# Patient Record
Sex: MALE | Race: WHITE | NOT HISPANIC OR LATINO | Employment: FULL TIME | URBAN - METROPOLITAN AREA
[De-identification: names, ages, dates, MRNs, and addresses within clinical notes are randomized per-mention and may not be internally consistent; named-entity substitution may affect disease eponyms.]

---

## 2017-03-20 ENCOUNTER — OFFICE VISIT (OUTPATIENT)
Dept: FAMILY MEDICINE | Facility: CLINIC | Age: 37
End: 2017-03-20
Payer: COMMERCIAL

## 2017-03-20 VITALS
SYSTOLIC BLOOD PRESSURE: 108 MMHG | DIASTOLIC BLOOD PRESSURE: 70 MMHG | OXYGEN SATURATION: 97 % | WEIGHT: 188.7 LBS | HEIGHT: 73 IN | BODY MASS INDEX: 25.01 KG/M2 | HEART RATE: 82 BPM | TEMPERATURE: 98.4 F

## 2017-03-20 DIAGNOSIS — Z71.6 ENCOUNTER FOR SMOKING CESSATION COUNSELING: ICD-10-CM

## 2017-03-20 DIAGNOSIS — F17.200 TOBACCO USE DISORDER: ICD-10-CM

## 2017-03-20 DIAGNOSIS — H65.01 ACUTE SEROUS OTITIS MEDIA, RIGHT EAR: Primary | ICD-10-CM

## 2017-03-20 DIAGNOSIS — J06.9 UPPER RESPIRATORY TRACT INFECTION, UNSPECIFIED TYPE: ICD-10-CM

## 2017-03-20 PROCEDURE — 99214 OFFICE O/P EST MOD 30 MIN: CPT | Performed by: FAMILY MEDICINE

## 2017-03-20 RX ORDER — FLUTICASONE PROPIONATE 50 MCG
2 SPRAY, SUSPENSION (ML) NASAL DAILY
Qty: 16 G | Refills: 3 | Status: SHIPPED | OUTPATIENT
Start: 2017-03-20 | End: 2018-08-08

## 2017-03-20 NOTE — MR AVS SNAPSHOT
"              After Visit Summary   3/20/2017    Dain Keys    MRN: [id number]           Patient Information     Date Of Birth          1980        Visit Information        Provider Department      3/20/2017 10:20 AM Katherine Wang MD [facility]        Today's Diagnoses     Acute serous otitis media, right ear    -  1    Upper respiratory tract infection, unspecified type          Care Instructions    Use Saline spray first and then follow with Flonase for ears and sinus symptoms. Do this until you feel better.     Use 600mg of Mucinex 2x a day for congestion.     Use Ibuprofen for headache and pain.     If ear pain persists or does not improve in 2-3 weeks, follow up.         Follow-ups after your visit        Who to contact     If you have questions or need follow up information about today's clinic visit or your schedule please contact [facility] directly at [phone].  Normal or non-critical lab and imaging results will be communicated to you by MyChart, letter or phone within 4 business days after the clinic has received the results. If you do not hear from us within 7 days, please contact the clinic through [employer] or phone. If you have a critical or abnormal lab result, we will notify you by phone as soon as possible.  Submit refill requests through [employer] or call your pharmacy and they will forward the refill request to us. Please allow 3 business days for your refill to be completed.          Additional Information About Your Visit        MyChart Information     [employer] lets you send messages to your doctor, view your test results, renew your prescriptions, schedule appointments and more. To sign up, go to [email or web address] . Click on \"Log in\" on the left side of the screen, which will take you to the Welcome page. Then click on \"Sign up Now\" on the right side of the page.     You will be asked to enter the access code listed below, as well " "as some personal information. Please follow the directions to create your username and password.     Your access code is: [email or web address]  Expires: 2017 10:59 AM     Your access code will  in 90 days. If you need help or a new code, please call your [place] clinic or [phone].        Care EveryWhere ID     This is your Care EveryWhere ID. This could be used by other organizations to access your [place] medical records  [id number]        Your Vitals Were     Pulse Temperature Height Pulse Oximetry BMI (Body Mass Index)       82 98.4  F (36.9  C) (Oral) 1.85 m (6' 0.83\") 97% 25.01 kg/m2        Blood Pressure from Last 3 Encounters:   17 108/70   16 116/72   16 105/70    Weight from Last 3 Encounters:   17 85.6 kg (188 lb 11.2 oz)   16 80.9 kg (178 lb 4.8 oz)   16 81.8 kg (180 lb 4.8 oz)              Today, you had the following     No orders found for display         Where to get your medicines      These medications were sent to [place] [id number] [place] [facility] [address] [facility]  [address] [facility] [address]     Phone:  [phone]     fluticasone 50 MCG/ACT spray          Primary Care Provider    [facility]       No address on file        Thank you!     Thank you for choosing [facility]  for your care. Our goal is always to provide you with excellent care. Hearing back from our patients is one way we can continue to improve our services. Please take a few minutes to complete the written survey that you may receive in the mail after your visit with us. Thank you!             Your Updated Medication List - Protect others around you: Learn how to safely use, store and throw away your medicines at www.disposemymeds.org.          This list is accurate as of: 3/20/17 11:00 AM.  Always use your most recent med list.                   Brand Name Dispense Instructions for use    aluminum chloride 20 % " external solution    DRYSOL    60 mL    Apply topically At Bedtime To improve effect, cover area of application with plastic wrap,  hold in place with tight shirt, and wash area in morning. As sweating improves, decrease use to 1-2 times weekly.       amLODIPine 2.5 MG tablet    NORVASC    30 tablet    Take 1 tablet (2.5 mg) by mouth daily       buPROPion 150 MG 12 hr tablet    WELLBUTRIN SR    60 tablet    Take 1 tablet once daily for first 3 days and increase to 1 tablet twice daily from day 4       fluticasone 50 MCG/ACT spray    FLONASE    16 g    Spray 2 sprays into both nostrils daily

## 2017-03-20 NOTE — PROGRESS NOTES
"  SUBJECTIVE:                                                    Dain Keys is a 36 year old male who presents to clinic today for the following health issues:      Acute Illness   Acute illness concerns: Ears/congestion  Onset: 3 weeks ear/ URI 1 day    Fever: no    Chills/Sweats: no    Headache (location?): YES    Sinus Pressure:YES    Conjunctivitis:  no    Ear Pain: YES: pressure    Rhinorrhea: YES    Congestion: YES    Sore Throat: YES     Cough: YES-productive of clear sputum    Wheeze: YES    Decreased Appetite: no    Nausea: no    Vomiting: no    Diarrhea:  no    Dysuria/Freq.: no    Fatigue/Achiness: no    Sick/Strep Exposure: no     Therapies Tried and outcome: OTC      Problem list and histories reviewed & adjusted, as indicated.  Additional history: as documented      Dain  will get ill with season changes, happens approx. 1x every three years and is usually with the transition from Fall to Winter. has been having a productive cough-yellow sputum as well as nasal congestion. Has a chest burn with coughing and hears mucus when he coughs. Accompanying sx's include frontal headache, sinus pressure, \"underwater sound\" triggered by putting his head down.  He has tried OTC Sudafed, Tylenol PM. Ear pain has been ongoing for 3-4 weeks, started during vacation and pain worsened throughout the vacation. Went to urgent care and physician noted stressed blood vessels and reccommended to f/u with provider when he returned if sx's did not improve. Denies fever. Neck feels achy.   Acute cold sx's only present for the last day      Additional Notes  - He is currently smoking 3-4 cigarettes a day. He  is ready to quit smoking. He has quit in the past with running and chewing tobacco. Has used Chantix but states it messed with his head.       Patient Active Problem List   Diagnosis     CARDIOVASCULAR SCREENING; LDL GOAL LESS THAN 160     Other seborrheic keratosis right posterior thorax      Muscle strain of left " shoulder     Anxiety     Raynaud's disease without gangrene     Primary focal hyperhidrosis, palms     Tobacco use disorder     Past Surgical History   Procedure Laterality Date     Hc tooth extraction w/forcep         Social History   Substance Use Topics     Smoking status: Current Every Day Smoker     Packs/day: 0.25     Years: 17.00     Types: Cigarettes     Smokeless tobacco: Never Used     Alcohol use Yes      Comment: rarely      Family History   Problem Relation Age of Onset     Depression/Anxiety Father      DIABETES Maternal Grandfather      CEREBROVASCULAR DISEASE Maternal Grandfather      Cardiovascular Maternal Grandfather      DIABETES Maternal Aunt      Coronary Artery Disease No family hx of      Hypertension No family hx of      Hyperlipidemia No family hx of      Breast Cancer No family hx of      Cancer - colorectal No family hx of      Ovarian Cancer No family hx of      Prostate Cancer No family hx of      Anesthesia Reaction No family hx of      Thyroid Disease No family hx of      Asthma No family hx of      OSTEOPOROSIS No family hx of      Chemical Addiction No family hx of      Known Genetic Syndrome No family hx of          Current Outpatient Prescriptions   Medication Sig Dispense Refill     fluticasone (FLONASE) 50 MCG/ACT nasal spray Spray 2 sprays into both nostrils daily (Patient not taking: Reported on 3/20/2017) 16 g 3     amLODIPine (NORVASC) 2.5 MG tablet Take 1 tablet (2.5 mg) by mouth daily (Patient not taking: Reported on 3/20/2017) 30 tablet 1     aluminum chloride (DRYSOL) 20 % external solution Apply topically At Bedtime To improve effect, cover area of application with plastic wrap,  hold in place with tight shirt, and wash area in morning. As sweating improves, decrease use to 1-2 times weekly. (Patient not taking: Reported on 3/20/2017) 60 mL 4     buPROPion (WELLBUTRIN SR) 150 MG 12 hr tablet Take 1 tablet once daily for first 3 days and increase to 1 tablet twice daily  "from day 4 (Patient not taking: Reported on 3/20/2017) 60 tablet 3     Allergies   Allergen Reactions     Ceclor [Cefaclor]      Septra [Sulfamethoxazole W/Trimethoprim]        Reviewed and updated as needed this visit by clinical staff  Tobacco  Allergies  Meds  Med Hx  Surg Hx  Fam Hx  Soc Hx      Reviewed and updated as needed this visit by Provider         ROS:  Constitutional, HEENT, cardiovascular, pulmonary, gi and gu systems are negative, except as otherwise noted.    OBJECTIVE:                                                    /70 (BP Location: Right arm, Patient Position: Chair, Cuff Size: Adult Large)  Pulse 82  Temp 98.4  F (36.9  C) (Oral)  Ht 1.85 m (6' 0.83\")  Wt 85.6 kg (188 lb 11.2 oz)  SpO2 97%  BMI 25.01 kg/m2  Body mass index is 25.01 kg/(m^2).  GENERAL: healthy, alert and no distress  HENT: ear canals normal,air fluid levels present behind right TM. mild nasal mucosal edema. mouth without ulcers or lesions  NECK: no adenopathy, no asymmetry, masses, or scars and thyroid normal to palpation  RESP: lungs clear to auscultation - no rales, rhonchi or wheezes  CV: regular rate and rhythm, normal S1 S2, no S3 or S4, no murmur, click or rub, no peripheral edema and peripheral pulses strong  PSYCH: mentation appears normal, affect normal/bright    Diagnostic Test Results:  none      ASSESSMENT/PLAN:                                                      1. Acute serous otitis media, right ear  2. Upper respiratory tract infection, unspecified type  Reviewed symptomatic management of symptoms including Flonase and saline rinses. Patient education provided, including expected course of illness and symptoms that may occur which would require urgent evalution. All questions answered.   Patient understands and agrees with plan. Reviewed red flag symptoms that would precipitate the need for routine, urgent or emergent f/u.   - fluticasone (FLONASE) 50 MCG/ACT spray; Spray 2 sprays into both " nostrils daily  Dispense: 16 g; Refill: 3      3. Tobacco use disorder  4. Encounter for smoking cessation counseling  Reviewed different options including Pharmacotherapies, NRT, and self-cessation. QuitPlan card given.     See Patient Instructions  Patient Instructions   Use Saline spray first and then follow with Flonase for ears and sinus symptoms. Do this until you feel better.     Use 600mg of Mucinex 2x a day for congestion.     Use Ibuprofen for headache and pain.     If ear pain persists or does not improve in 2-3 weeks, follow up.         This document serves as a record of the services and decisions personally performed and made by Katherine Wang MD. It was created on her behalf by Yen Taylor,a trained medical scribe. The creation of this document is based the provider's statements to the medical scribe.  Yen Taylor March 20, 2017 10:59 AM     The information in this document, created by a scribe for me, accurately reflects the services I personally performed and the decisions made by me. I have reviewed and approved this document for accuracy.    MD Katherine Landers MD  [facility]

## 2017-03-20 NOTE — PATIENT INSTRUCTIONS
Use Saline spray first and then follow with Flonase for ears and sinus symptoms. Do this until you feel better.     Use 600mg of Mucinex 2x a day for congestion.     Use Ibuprofen for headache and pain.     If ear pain persists or does not improve in 2-3 weeks, follow up.

## 2017-03-20 NOTE — NURSING NOTE
"Chief Complaint   Patient presents with     URI       Initial /70 (BP Location: Right arm, Patient Position: Chair, Cuff Size: Adult Large)  Pulse 82  Temp 98.4  F (36.9  C) (Oral)  Ht 1.85 m (6' 0.83\")  Wt 85.6 kg (188 lb 11.2 oz)  SpO2 97%  BMI 25.01 kg/m2 Estimated body mass index is 25.01 kg/(m^2) as calculated from the following:    Height as of this encounter: 1.85 m (6' 0.83\").    Weight as of this encounter: 85.6 kg (188 lb 11.2 oz).  Medication Reconciliation: complete     NEEL Sloan MA      "

## 2018-08-08 ENCOUNTER — OFFICE VISIT (OUTPATIENT)
Dept: PEDIATRICS | Facility: CLINIC | Age: 38
End: 2018-08-08
Payer: COMMERCIAL

## 2018-08-08 VITALS
SYSTOLIC BLOOD PRESSURE: 104 MMHG | TEMPERATURE: 97.5 F | DIASTOLIC BLOOD PRESSURE: 70 MMHG | OXYGEN SATURATION: 98 % | HEIGHT: 72 IN | BODY MASS INDEX: 23.98 KG/M2 | WEIGHT: 177 LBS | HEART RATE: 85 BPM

## 2018-08-08 DIAGNOSIS — Z13.6 CARDIOVASCULAR SCREENING; LDL GOAL LESS THAN 160: ICD-10-CM

## 2018-08-08 DIAGNOSIS — Z11.3 SCREEN FOR STD (SEXUALLY TRANSMITTED DISEASE): ICD-10-CM

## 2018-08-08 DIAGNOSIS — Z00.00 ROUTINE GENERAL MEDICAL EXAMINATION AT A HEALTH CARE FACILITY: Primary | ICD-10-CM

## 2018-08-08 DIAGNOSIS — K64.4 EXTERNAL HEMORRHOIDS: ICD-10-CM

## 2018-08-08 DIAGNOSIS — Z71.6 ENCOUNTER FOR TOBACCO USE CESSATION COUNSELING: ICD-10-CM

## 2018-08-08 DIAGNOSIS — Z13.1 SCREENING FOR DIABETES MELLITUS: ICD-10-CM

## 2018-08-08 PROCEDURE — 99395 PREV VISIT EST AGE 18-39: CPT | Performed by: INTERNAL MEDICINE

## 2018-08-08 NOTE — PATIENT INSTRUCTIONS
Make appointment(s) for:   -- get labs today.             Preventive Health Recommendations  Male Ages 26 - 39    Yearly exam:             See your health care provider every year in order to  o   Review health changes.   o   Discuss preventive care.    o   Review your medicines if your doctor has prescribed any.    You should be tested each year for STDs (sexually transmitted diseases), if you re at risk.     After age 35, talk to your provider about cholesterol testing. If you are at risk for heart disease, have your cholesterol tested at least every 5 years.     If you are at risk for diabetes, you should have a diabetes test (fasting glucose).  Shots: Get a flu shot each year. Get a tetanus shot every 10 years.     Nutrition:    Eat at least 5 servings of fruits and vegetables daily.     Eat whole-grain bread, whole-wheat pasta and brown rice instead of white grains and rice.     Get adequate Calcium and Vitamin D.     Lifestyle    Exercise for at least 150 minutes a week (30 minutes a day, 5 days a week). This will help you control your weight and prevent disease.     Limit alcohol to one drink per day.     No smoking.     Wear sunscreen to prevent skin cancer.     See your dentist every six months for an exam and cleaning.

## 2018-08-08 NOTE — MR AVS SNAPSHOT
After Visit Summary   8/8/2018    Dain Keys    MRN: [id number]           Patient Information     Date Of Birth          1980        Visit Information        Provider Department      8/8/2018 10:50 AM Ramone Stewart MD PhD M [facility]        Today's Diagnoses     Routine general medical examination at a health care facility    -  1    Encounter for tobacco use cessation counseling        CARDIOVASCULAR SCREENING; LDL GOAL LESS THAN 160        Screening for diabetes mellitus        External hemorrhoids        Screen for STD (sexually transmitted disease)          Care Instructions    Make appointment(s) for:   -- get labs today.             Preventive Health Recommendations  Male Ages 26 - 39    Yearly exam:             See your health care provider every year in order to  o   Review health changes.   o   Discuss preventive care.    o   Review your medicines if your doctor has prescribed any.    You should be tested each year for STDs (sexually transmitted diseases), if you re at risk.     After age 35, talk to your provider about cholesterol testing. If you are at risk for heart disease, have your cholesterol tested at least every 5 years.     If you are at risk for diabetes, you should have a diabetes test (fasting glucose).  Shots: Get a flu shot each year. Get a tetanus shot every 10 years.     Nutrition:    Eat at least 5 servings of fruits and vegetables daily.     Eat whole-grain bread, whole-wheat pasta and brown rice instead of white grains and rice.     Get adequate Calcium and Vitamin D.     Lifestyle    Exercise for at least 150 minutes a week (30 minutes a day, 5 days a week). This will help you control your weight and prevent disease.     Limit alcohol to one drink per day.     No smoking.     Wear sunscreen to prevent skin cancer.     See your dentist every six months for an exam and cleaning.             Follow-ups after your visit        Who to contact     If you  "have questions or need follow up information about today's clinic visit or your schedule please contact [facility] directly at [phone].  Normal or non-critical lab and imaging results will be communicated to you by MyChart, letter or phone within 4 business days after the clinic has received the results. If you do not hear from us within 7 days, please contact the clinic through MyChart or phone. If you have a critical or abnormal lab result, we will notify you by phone as soon as possible.  Submit refill requests through [employer] or call your pharmacy and they will forward the refill request to us. Please allow 3 business days for your refill to be completed.          Additional Information About Your Visit        Care EveryWhere ID     This is your Care EveryWhere ID. This could be used by other organizations to access your [place] medical records  [id number]        Your Vitals Were     Pulse Temperature Height Pulse Oximetry BMI (Body Mass Index)       85 97.5  F (36.4  C) (Temporal) 5' 11.75\" (1.822 m) 98% 24.17 kg/m2        Blood Pressure from Last 3 Encounters:   08/08/18 104/70   03/20/17 108/70   09/06/16 116/72    Weight from Last 3 Encounters:   08/08/18 177 lb (80.3 kg)   03/20/17 188 lb 11.2 oz (85.6 kg)   09/06/16 178 lb 4.8 oz (80.9 kg)              We Performed the Following     CHLAMYDIA TRACHOMATIS PCR     Glucose     Hepatitis B Surface Antibody     HIV Antigen Antibody Combo     Lipid panel reflex to direct LDL Fasting     NEISSERIA GONORRHOEA PCR     Treponema Abs w Reflex to RPR and Titer        Primary Care Provider Office Phone # Fax #    [facility] [phone]       [address]  [facility] [address]        Equal Access to Services     ALPHONSO MOBLEY AH: Jessi molinao Solico, waaxda luqadaha, qaybta kaalmada adechiquitayada, gaurang cooper. So [facility] [phone].    ATENCIÓN: Si herbert aldana " albarado disposición servicios gratuitos de asistencia lingüística. Debby kerr [phone].    We comply with applicable federal civil rights laws and [place] laws. We do not discriminate on the basis of race, color, national origin, age, disability, sex, sexual orientation, or gender identity.            Thank you!     Thank you for choosing [facility]  for your care. Our goal is always to provide you with excellent care. Hearing back from our patients is one way we can continue to improve our services. Please take a few minutes to complete the written survey that you may receive in the mail after your visit with us. Thank you!             Your Updated Medication List - Protect others around you: Learn how to safely use, store and throw away your medicines at www.disposemymeds.org.      Notice  As of 8/8/2018 11:39 AM    You have not been prescribed any medications.

## 2018-08-08 NOTE — PROGRESS NOTES
SUBJECTIVE:   CC: Dain Keys is an 37 year old male who presents for preventative health visit.     Healthy Habits:    Do you get at least three servings of calcium containing foods daily (dairy, green leafy vegetables, etc.)? no    Amount of exercise or daily activities, outside of work: 2-3 day(s) per week    Problems taking medications regularly No    Medication side effects: No    Have you had an eye exam in the past two years? no    Do you see a dentist twice per year? no    Do you have sleep apnea, excessive snoring or daytime drowsiness?no       -------------------------------------    Today's PHQ-2 Score:   PHQ-2 ( 1999 [employer]) 8/8/2018 3/20/2017   Q1: Little interest or pleasure in doing things 0 0   Q2: Feeling down, depressed or hopeless 0 0   PHQ-2 Score 0 0       Abuse: Current or Past(Physical, Sexual or Emotional)- No  Do you feel safe in your environment - Yes    Social History   Substance Use Topics     Smoking status: Current Every Day Smoker     Packs/day: 0.25     Years: 17.00     Types: Cigarettes     Smokeless tobacco: Never Used     Alcohol use Yes      Comment: rarely       If you drink alcohol do you typically have >3 drinks per day or >7 drinks per week? No                      Last PSA: No results found for: PSA    Reviewed orders with patient. Reviewed health maintenance and updated orders accordingly - Yes  Labs reviewed in EPIC    Reviewed and updated as needed this visit by clinical staff  Tobacco  Allergies  Meds  Med Hx  Surg Hx  Fam Hx  Soc Hx        Reviewed and updated as needed this visit by Provider            ROS:  CONSTITUTIONAL: NEGATIVE for fever, chills, change in weight  INTEGUMENTARY/SKIN: NEGATIVE for worrisome rashes, moles or lesions  EYES: NEGATIVE for vision changes or irritation  ENT: NEGATIVE for ear, mouth and throat problems  RESP: NEGATIVE for significant cough or SOB  CV: NEGATIVE for chest pain, palpitations or peripheral edema  GI: NEGATIVE for  "nausea, abdominal pain, heartburn, or change in bowel habits   male: negative for dysuria, hematuria, decreased urinary stream, erectile dysfunction, urethral discharge  MUSCULOSKELETAL: NEGATIVE for significant arthralgias or myalgia  NEURO: NEGATIVE for weakness, dizziness or paresthesias  PSYCHIATRIC: NEGATIVE for changes in mood or affect    OBJECTIVE:   /70  Pulse 85  Temp 97.5  F (36.4  C) (Temporal)  Ht 5' 11.75\" (1.822 m)  Wt 177 lb (80.3 kg)  SpO2 98%  BMI 24.17 kg/m2  EXAM:  GENERAL: healthy, alert and no distress  EYES: Eyes grossly normal to inspection, PERRL and conjunctivae and sclerae normal  HENT: ear canals and TM's normal, nose and mouth without ulcers or lesions  NECK: no adenopathy, no asymmetry, masses, or scars and thyroid normal to palpation  RESP: lungs clear to auscultation - no rales, rhonchi or wheezes  CV: regular rate and rhythm, normal S1 S2, no S3 or S4, no murmur, click or rub, no peripheral edema and peripheral pulses strong  ABDOMEN: soft, nontender, no hepatosplenomegaly, no masses and bowel sounds normal  MS: no gross musculoskeletal defects noted, no edema  Rectal exam: one small external hemorrhioid present.   SKIN: no suspicious lesions or rashes  NEURO: Normal strength and tone, mentation intact and speech normal  PSYCH: mentation appears normal, affect normal/bright    Diagnostic Test Results:  No results found for this or any previous visit (from the past 24 hour(s)).    ASSESSMENT/PLAN:       ICD-10-CM    1. Routine general medical examination at a health care facility Z00.00    2. Encounter for tobacco use cessation counseling Z71.6    3. CARDIOVASCULAR SCREENING; LDL GOAL LESS THAN 160 Z13.6 Lipid panel reflex to direct LDL Fasting     CANCELED: Lipid panel reflex to direct LDL Fasting   4. Screening for diabetes mellitus Z13.1 Glucose     CANCELED: Glucose   5. External hemorrhoids K64.4    6. Screen for STD (sexually transmitted disease) Z11.3 HIV Antigen " "Antibody Combo     Treponema Abs w Reflex to RPR and Titer     Hepatitis B Surface Antibody     Chlamydia trachomatis PCR     Neisseria gonorrhoeae PCR     CANCELED: HIV Antigen Antibody Combo     CANCELED: Treponema Abs w Reflex to RPR and Titer     CANCELED: Hepatitis B Surface Antibody     CANCELED: NEISSERIA GONORRHOEA PCR     CANCELED: CHLAMYDIA TRACHOMATIS PCR     -- pt requested STD screen. No new exposure in the last 2 years but wanted to check. ordered.  -- small external hemorrhoid: discussed diet, avoiding constipation.     COUNSELING:  Reviewed preventive health counseling, as reflected in patient instructions    BP Readings from Last 1 Encounters:   08/08/18 104/70     Estimated body mass index is 24.17 kg/(m^2) as calculated from the following:    Height as of this encounter: 5' 11.75\" (1.822 m).    Weight as of this encounter: 177 lb (80.3 kg).           reports that he has been smoking Cigarettes.  He has a 4.25 pack-year smoking history. He has never used smokeless tobacco.  Tobacco Cessation Action Plan: Self help information given to patientprevious side effects with chantix wellbutrin, patch and gum. He has decided to cut down on his own slowly. Using nicotine lozenges.     Counseling Resources:  ATP IV Guidelines  Pooled Cohorts Equation Calculator  FRAX Risk Assessment  ICSI Preventive Guidelines  Dietary Guidelines for Americans, 2010  USDA's MyPlate  ASA Prophylaxis  Lung CA Screening    Ramone Stewart MD PhD  [facility] [[email or web address]]  "

## 2018-11-16 ENCOUNTER — OFFICE VISIT (OUTPATIENT)
Dept: PEDIATRICS | Facility: CLINIC | Age: 38
End: 2018-11-16
Payer: COMMERCIAL

## 2018-11-16 VITALS
WEIGHT: 176.6 LBS | HEART RATE: 78 BPM | OXYGEN SATURATION: 98 % | DIASTOLIC BLOOD PRESSURE: 72 MMHG | TEMPERATURE: 99 F | HEIGHT: 72 IN | BODY MASS INDEX: 23.92 KG/M2 | SYSTOLIC BLOOD PRESSURE: 102 MMHG

## 2018-11-16 DIAGNOSIS — F41.9 ANXIETY: ICD-10-CM

## 2018-11-16 DIAGNOSIS — Z11.3 SCREEN FOR STD (SEXUALLY TRANSMITTED DISEASE): ICD-10-CM

## 2018-11-16 DIAGNOSIS — R63.4 UNINTENDED WEIGHT LOSS: Primary | ICD-10-CM

## 2018-11-16 DIAGNOSIS — F17.200 TOBACCO USE DISORDER: ICD-10-CM

## 2018-11-16 LAB
ALBUMIN SERPL-MCNC: 4.5 G/DL (ref 3.4–5)
ALBUMIN UR-MCNC: NEGATIVE MG/DL
ALP SERPL-CCNC: 72 U/L (ref 40–150)
ALT SERPL W P-5'-P-CCNC: 26 U/L (ref 0–70)
ANION GAP SERPL CALCULATED.3IONS-SCNC: 6 MMOL/L (ref 3–14)
APPEARANCE UR: CLEAR
AST SERPL W P-5'-P-CCNC: 15 U/L (ref 0–45)
BASOPHILS # BLD AUTO: 0.1 10E9/L (ref 0–0.2)
BASOPHILS NFR BLD AUTO: 0.4 %
BILIRUB SERPL-MCNC: 0.3 MG/DL (ref 0.2–1.3)
BILIRUB UR QL STRIP: NEGATIVE
BUN SERPL-MCNC: 16 MG/DL (ref 7–30)
CALCIUM SERPL-MCNC: 9.4 MG/DL (ref 8.5–10.1)
CHLORIDE SERPL-SCNC: 105 MMOL/L (ref 94–109)
CO2 SERPL-SCNC: 26 MMOL/L (ref 20–32)
COLOR UR AUTO: YELLOW
CREAT SERPL-MCNC: 0.74 MG/DL (ref 0.66–1.25)
DIFFERENTIAL METHOD BLD: ABNORMAL
EOSINOPHIL # BLD AUTO: 0.1 10E9/L (ref 0–0.7)
EOSINOPHIL NFR BLD AUTO: 1.2 %
ERYTHROCYTE [DISTWIDTH] IN BLOOD BY AUTOMATED COUNT: 13.6 % (ref 10–15)
ERYTHROCYTE [SEDIMENTATION RATE] IN BLOOD BY WESTERGREN METHOD: 4 MM/H (ref 0–15)
GFR SERPL CREATININE-BSD FRML MDRD: >90 ML/MIN/1.7M2
GLUCOSE SERPL-MCNC: 88 MG/DL (ref 70–99)
GLUCOSE UR STRIP-MCNC: NEGATIVE MG/DL
HCT VFR BLD AUTO: 45.2 % (ref 40–53)
HGB BLD-MCNC: 14.9 G/DL (ref 13.3–17.7)
HGB UR QL STRIP: NEGATIVE
IMM GRANULOCYTES # BLD: 0 10E9/L (ref 0–0.4)
IMM GRANULOCYTES NFR BLD: 0.4 %
KETONES UR STRIP-MCNC: NEGATIVE MG/DL
LEUKOCYTE ESTERASE UR QL STRIP: NEGATIVE
LYMPHOCYTES # BLD AUTO: 3.4 10E9/L (ref 0.8–5.3)
LYMPHOCYTES NFR BLD AUTO: 29.9 %
MCH RBC QN AUTO: 30.5 PG (ref 26.5–33)
MCHC RBC AUTO-ENTMCNC: 33 G/DL (ref 31.5–36.5)
MCV RBC AUTO: 92 FL (ref 78–100)
MONOCYTES # BLD AUTO: 1 10E9/L (ref 0–1.3)
MONOCYTES NFR BLD AUTO: 8.8 %
NEUTROPHILS # BLD AUTO: 6.7 10E9/L (ref 1.6–8.3)
NEUTROPHILS NFR BLD AUTO: 59.3 %
NITRATE UR QL: NEGATIVE
PH UR STRIP: 5.5 PH (ref 5–7)
PLATELET # BLD AUTO: 336 10E9/L (ref 150–450)
POTASSIUM SERPL-SCNC: 3.8 MMOL/L (ref 3.4–5.3)
PROT SERPL-MCNC: 8.1 G/DL (ref 6.8–8.8)
RBC # BLD AUTO: 4.89 10E12/L (ref 4.4–5.9)
RBC #/AREA URNS AUTO: NORMAL /HPF
SODIUM SERPL-SCNC: 137 MMOL/L (ref 133–144)
SOURCE: NORMAL
SP GR UR STRIP: 1.02 (ref 1–1.03)
TSH SERPL DL<=0.005 MIU/L-ACNC: 0.82 MU/L (ref 0.4–4)
UROBILINOGEN UR STRIP-MCNC: NORMAL MG/DL (ref 0–2)
WBC # BLD AUTO: 11.4 10E9/L (ref 4–11)
WBC #/AREA URNS AUTO: NORMAL /HPF

## 2018-11-16 PROCEDURE — 36415 COLL VENOUS BLD VENIPUNCTURE: CPT | Performed by: INTERNAL MEDICINE

## 2018-11-16 PROCEDURE — 86803 HEPATITIS C AB TEST: CPT | Performed by: INTERNAL MEDICINE

## 2018-11-16 PROCEDURE — 87491 CHLMYD TRACH DNA AMP PROBE: CPT | Performed by: INTERNAL MEDICINE

## 2018-11-16 PROCEDURE — 84443 ASSAY THYROID STIM HORMONE: CPT | Performed by: INTERNAL MEDICINE

## 2018-11-16 PROCEDURE — 99214 OFFICE O/P EST MOD 30 MIN: CPT | Performed by: INTERNAL MEDICINE

## 2018-11-16 PROCEDURE — 80053 COMPREHEN METABOLIC PANEL: CPT | Performed by: INTERNAL MEDICINE

## 2018-11-16 PROCEDURE — 85652 RBC SED RATE AUTOMATED: CPT | Performed by: INTERNAL MEDICINE

## 2018-11-16 PROCEDURE — 85025 COMPLETE CBC W/AUTO DIFF WBC: CPT | Performed by: INTERNAL MEDICINE

## 2018-11-16 PROCEDURE — 87389 HIV-1 AG W/HIV-1&-2 AB AG IA: CPT | Performed by: INTERNAL MEDICINE

## 2018-11-16 PROCEDURE — 87591 N.GONORRHOEAE DNA AMP PROB: CPT | Performed by: INTERNAL MEDICINE

## 2018-11-16 PROCEDURE — 81001 URINALYSIS AUTO W/SCOPE: CPT | Performed by: INTERNAL MEDICINE

## 2018-11-16 PROCEDURE — 87340 HEPATITIS B SURFACE AG IA: CPT | Performed by: INTERNAL MEDICINE

## 2018-11-16 NOTE — LETTER
November 20, 2018      Dain Keys  [address] [facility] [address]        Dear ,    We are writing to inform you of your test results.    The sexually transmitted disease tests were all negative.  Thyroid test normal.  Kidney, liver test and electrolytes normal.  Your hemoglobin and white count are good.  Sedimentation rate which indicates degree of inflammation in the body was normal.    Essentially all the tests are normal.  Lab does not reveal any evidence of significant health issue.    Resulted Orders   NEISSERIA GONORRHOEA PCR   Result Value Ref Range    Specimen Descrip Urine     N Gonorrhea PCR Negative NEG^Negative      Comment:      Negative for N. gonorrhoeae rRNA by transcription mediated amplification.  A negative result by transcription mediated amplification does not preclude   the presence of N. gonorrhoeae infection because results are dependent on   proper and adequate collection, absence of inhibitors, and sufficient rRNA to   be detected.     CHLAMYDIA TRACHOMATIS PCR   Result Value Ref Range    Specimen Description Urine     Chlamydia Trachomatis PCR Negative NEG^Negative      Comment:      Negative for C. trachomatis rRNA by transcription mediated amplification.  A negative result by transcription mediated amplification does not preclude   the presence of C. trachomatis infection because results are dependent on   proper and adequate collection, absence of inhibitors, and sufficient rRNA to   be detected.     HIV Antigen Antibody Combo   Result Value Ref Range    HIV Antigen Antibody Combo Nonreactive NR^Nonreactive          Comment:      HIV-1 p24 Ag & HIV-1/HIV-2 Ab Not Detected   Hepatitis B surface antigen   Result Value Ref Range    Hep B Surface Agn Nonreactive NR^Nonreactive   Erythrocyte sedimentation rate auto   Result Value Ref Range    Sed Rate 4 0 - 15 mm/h   CBC with platelets differential   Result Value Ref Range    WBC 11.4 (H) 4.0 - 11.0  10e9/L    RBC Count 4.89 4.4 - 5.9 10e12/L    Hemoglobin 14.9 13.3 - 17.7 g/dL    Hematocrit 45.2 40.0 - 53.0 %    MCV 92 78 - 100 fl    MCH 30.5 26.5 - 33.0 pg    MCHC 33.0 31.5 - 36.5 g/dL    RDW 13.6 10.0 - 15.0 %    Platelet Count 336 150 - 450 10e9/L    % Neutrophils 59.3 %    % Lymphocytes 29.9 %    % Monocytes 8.8 %    % Eosinophils 1.2 %    % Basophils 0.4 %    % Immature Granulocytes 0.4 %    Absolute Neutrophil 6.7 1.6 - 8.3 10e9/L    Absolute Lymphocytes 3.4 0.8 - 5.3 10e9/L    Absolute Monocytes 1.0 0.0 - 1.3 10e9/L    Absolute Eosinophils 0.1 0.0 - 0.7 10e9/L    Absolute Basophils 0.1 0.0 - 0.2 10e9/L    Abs Immature Granulocytes 0.0 0 - 0.4 10e9/L    Diff Method Automated Method    Comprehensive metabolic panel   Result Value Ref Range    Sodium 137 133 - 144 mmol/L    Potassium 3.8 3.4 - 5.3 mmol/L    Chloride 105 94 - 109 mmol/L    Carbon Dioxide 26 20 - 32 mmol/L    Anion Gap 6 3 - 14 mmol/L    Glucose 88 70 - 99 mg/dL    Urea Nitrogen 16 7 - 30 mg/dL    Creatinine 0.74 0.66 - 1.25 mg/dL    GFR Estimate >90 >60 mL/min/1.7m2      Comment:      Non  GFR Calc    GFR Estimate If Black >90 >60 mL/min/1.7m2      Comment:       GFR Calc    Calcium 9.4 8.5 - 10.1 mg/dL    Bilirubin Total 0.3 0.2 - 1.3 mg/dL    Albumin 4.5 3.4 - 5.0 g/dL    Protein Total 8.1 6.8 - 8.8 g/dL    Alkaline Phosphatase 72 40 - 150 U/L    ALT 26 0 - 70 U/L    AST 15 0 - 45 U/L   TSH   Result Value Ref Range    TSH 0.82 0.40 - 4.00 mU/L   **Hepatitis C Screen Reflex to RNA FUTURE anytime   Result Value Ref Range    Hepatitis C Antibody Nonreactive NR^Nonreactive      Comment:      Assay performance characteristics have not been established for newborns,   infants, and children     UA with Microscopic (Madelyn Guardado; [facility])   Result Value Ref Range    Color Urine Yellow     Appearance Urine Clear     Glucose Urine Negative NEG^Negative mg/dL    Bilirubin Urine Negative NEG^Negative    Ketones  Urine Negative NEG^Negative mg/dL    Specific Gravity Urine 1.022 1.003 - 1.035    Blood Urine Negative NEG^Negative    pH Urine 5.5 5.0 - 7.0 pH    Protein Albumin Urine Negative NEG^Negative mg/dL    Urobilinogen mg/dL Normal 0.0 - 2.0 mg/dL    Nitrite Urine Negative NEG^Negative    Leukocyte Esterase Urine Negative NEG^Negative    Source Clean catch urine     WBC Urine 0 - 5 OTO5^0 - 5 /HPF    RBC Urine O - 2 OTO2^O - 2 /HPF       If you have any questions or concerns, please call the clinic at the number listed above.       Sincerely,        Arnoldo Up MD

## 2018-11-16 NOTE — PROGRESS NOTES
"  SUBJECTIVE:   Dain Keys is a 37 year old male who presents to clinic today for the following health issues:      Concerned with recent weight loss over the last couple weeks.  No signs of illness.  Patient states it could be related to recent stress at work.      HPI  37-year-old gentleman comes in stating that he has had some unintended weight loss.  Normally weighs around 178-180 pounds.  He is down to now 176 in spite of eating a lot.  Is less than half a pack per day smoker.  He does indicate that he feels stressed at work and has little bit of anxiety related to that.  He also is requesting STD check.  He is  and claims he has been faithful to his wife.    Denies chest pain, shortness of breath dizziness or lightheadedness.  No diarrhea nausea vomiting.  No abdominal pain.  No urogenital symptoms.  He otherwise feels good.  Does not exercise regularly.    Problem list and histories reviewed & adjusted, as indicated.  Additional history: as documented    No current outpatient prescriptions on file.     Allergies   Allergen Reactions     Ceclor [Cefaclor]      Septra [Sulfamethoxazole W/Trimethoprim]        Reviewed and updated as needed this visit by clinical staff  Tobacco  Allergies  Meds  Med Hx  Surg Hx  Fam Hx  Soc Hx      Reviewed and updated as needed this visit by Provider         ROS:  Constitutional, HEENT, cardiovascular, pulmonary, GI, , musculoskeletal, neuro, skin, endocrine and psych systems are negative, except as otherwise noted.    OBJECTIVE:     /72 (BP Location: Right arm, Patient Position: Sitting, Cuff Size: Adult Regular)  Pulse 78  Temp 99  F (37.2  C) (Temporal)  Ht 5' 11.75\" (1.822 m)  Wt 176 lb 9.6 oz (80.1 kg)  SpO2 98%  BMI 24.12 kg/m2  Body mass index is 24.12 kg/(m^2).  GENERAL: healthy, alert and no distress  EYES: Eyes grossly normal to inspection, PERRL and conjunctivae and sclerae normal  HENT: ear canals and TM's normal, nose and mouth " without ulcers or lesions  NECK: no adenopathy, no asymmetry, masses, or scars and thyroid normal to palpation  RESP: lungs clear to auscultation - no rales, rhonchi or wheezes  CV: regular rate and rhythm, normal S1 S2, no S3 or S4, no murmur, click or rub, no peripheral edema and peripheral pulses strong  ABDOMEN: soft, nontender, no hepatosplenomegaly, no masses and bowel sounds normal  MS: no gross musculoskeletal defects noted, no edema  SKIN: no suspicious lesions or rashes  NEURO: Normal strength and tone, mentation intact and speech normal  PSYCH: mentation appears normal, affect normal/bright  LYMPH: no cervical, supraclavicular, axillary, or inguinal adenopathy    Diagnostic Test Results:  none     ASSESSMENT/PLAN:     1.  History of unintentional weight loss though by our record he is probably down 4 pounds at most.  But he is clearly worried and anxious about it.  2.  Anxiety disorder.  3.  Chronic tobacco use in form of smoking.    I informed the patient I will check CBCs, CMP, UA, TSH and sed rate and get back to him with the results and recommendation.  I saw no obvious reasons  for weight loss.  At his request STD screening test ordered.      Arnoldo Up MD  [facility]

## 2018-11-16 NOTE — MR AVS SNAPSHOT
After Visit Summary   11/16/2018    Dain Keys    MRN: [id number]           Patient Information     Date Of Birth          1980        Visit Information        Provider Department      11/16/2018 2:50 PM Arnoldo Up MD [facility]        Today's Diagnoses     Unintended weight loss    -  1    Tobacco use disorder        Anxiety        Screen for STD (sexually transmitted disease)           Follow-ups after your visit        Follow-up notes from your care team     Return if symptoms worsen or fail to improve.      Future tests that were ordered for you today     Open Future Orders        Priority Expected Expires Ordered    UA with Microscopic Routine 11/16/2018 11/16/2019 11/16/2018    **Hepatitis C Screen Reflex to RNA FUTURE anytime Routine 11/16/2018 11/16/2019 11/16/2018    Erythrocyte sedimentation rate auto Routine 11/16/2018 11/16/2019 11/16/2018    CBC with platelets differential Routine 11/16/2018 12/16/2018 11/16/2018    Comprehensive metabolic panel Routine 11/16/2018 11/16/2019 11/16/2018    TSH Routine 11/16/2018 11/16/2019 11/16/2018            Who to contact     If you have questions or need follow up information about today's clinic visit or your schedule please contact [facility] directly at [phone].  Normal or non-critical lab and imaging results will be communicated to you by MyChart, letter or phone within 4 business days after the clinic has received the results. If you do not hear from us within 7 days, please contact the clinic through [employer] or phone. If you have a critical or abnormal lab result, we will notify you by phone as soon as possible.  Submit refill requests through [employer] or call your pharmacy and they will forward the refill request to us. Please allow 3 business days for your refill to be completed.          Additional Information About Your Visit        [employer] Information     [employer] is an electronic  "gateway that provides easy, online access to your medical records. With De[employer]ide, you can request a clinic appointment, read your test results, renew a prescription or communicate with your care team.     To sign up for Dezide visit the website at [email or web address]   You will be asked to enter the access code listed below, as well as some personal information. Please follow the directions to create your username and password.     Your access code is: [email or web address]  Expires: 2019  3:54 PM     Your access code will  in 90 days. If you need help or a new code, please contact your [facility] or call [phone] for assistance.        Care EveryWhere ID     This is your Care EveryWhere ID. This could be used by other organizations to access your [place] medical records  [id number]        Your Vitals Were     Pulse Temperature Height Pulse Oximetry BMI (Body Mass Index)       78 99  F (37.2  C) (Temporal) 5' 11.75\" (1.822 m) 98% 24.12 kg/m2        Blood Pressure from Last 3 Encounters:   18 102/72   18 104/70   17 108/70    Weight from Last 3 Encounters:   18 176 lb 9.6 oz (80.1 kg)   18 177 lb (80.3 kg)   17 188 lb 11.2 oz (85.6 kg)              We Performed the Following     CHLAMYDIA TRACHOMATIS PCR     Hepatitis B surface antigen     HIV Antigen Antibody Combo     NEISSERIA GONORRHOEA PCR        Primary Care Provider Office Phone # Fax #    [facility] [phone]       [address]  [facility] [address]        Equal Access to Services     ALPHONSO MOBLEY : Hadii aad ku hadasho Soomaali, waaxda luqadaha, qaybta kaalmada adeegyada, gaurang cooper. So [facility] [phone].    ATENCIÓN: Si habla español, tiene a albarado disposición servicios gratuitos de asistencia lingüística. Llame al [phone].    We comply with applicable federal civil rights laws and [place] laws. " We do not discriminate on the basis of race, color, national origin, age, disability, sex, sexual orientation, or gender identity.            Thank you!     Thank you for choosing [facility]  for your care. Our goal is always to provide you with excellent care. Hearing back from our patients is one way we can continue to improve our services. Please take a few minutes to complete the written survey that you may receive in the mail after your visit with us. Thank you!             Your Updated Medication List - Protect others around you: Learn how to safely use, store and throw away your medicines at www.disposemymeds.org.      Notice  As of 11/16/2018  3:54 PM    You have not been prescribed any medications.

## 2018-11-18 LAB
C TRACH DNA SPEC QL NAA+PROBE: NEGATIVE
N GONORRHOEA DNA SPEC QL NAA+PROBE: NEGATIVE
SPECIMEN SOURCE: NORMAL
SPECIMEN SOURCE: NORMAL

## 2018-11-19 LAB
HBV SURFACE AG SERPL QL IA: NONREACTIVE
HCV AB SERPL QL IA: NONREACTIVE
HIV 1+2 AB+HIV1 P24 AG SERPL QL IA: NONREACTIVE

## 2018-12-03 ENCOUNTER — TELEPHONE (OUTPATIENT)
Dept: PEDIATRICS | Facility: CLINIC | Age: 38
End: 2018-12-03

## 2018-12-03 NOTE — TELEPHONE ENCOUNTER
Attempt #1    Left message for patient to return call to clinic.  Clinic number given.    Savanah Chavez CMA

## 2018-12-03 NOTE — TELEPHONE ENCOUNTER
Patient advised per Dr. Up all test results were negative and letter mailed to patient  Patient stated understanding.    Savanah Chavez CMA

## 2018-12-03 NOTE — TELEPHONE ENCOUNTER
Routing to Dr. Up to review and advise on results when able.    Brigitte Garcia RN, [facility]

## 2018-12-03 NOTE — TELEPHONE ENCOUNTER
Patient returned call to Dr. Up's care team regarding results.  He states that he will be available for another hour and then he will be gone.  Please call when available.

## 2018-12-03 NOTE — TELEPHONE ENCOUNTER
[facility] Call Center    Phone Message    May a detailed message be left on voicemail: yes    Reason for Call: Patient would like to know the results of his lab work that was taken on 11/16/18. Requesting a call back with those results. Thank you    Action Taken: Message routed to:  Primary Care p [phone]

## 2019-12-13 ENCOUNTER — OFFICE VISIT (OUTPATIENT)
Dept: PEDIATRICS | Facility: CLINIC | Age: 39
End: 2019-12-13
Payer: COMMERCIAL

## 2019-12-13 VITALS
WEIGHT: 178.4 LBS | TEMPERATURE: 98.4 F | HEART RATE: 89 BPM | BODY MASS INDEX: 24.16 KG/M2 | SYSTOLIC BLOOD PRESSURE: 100 MMHG | DIASTOLIC BLOOD PRESSURE: 70 MMHG | OXYGEN SATURATION: 100 % | HEIGHT: 72 IN

## 2019-12-13 DIAGNOSIS — L04.3 ACUTE LYMPHADENITIS OF LOWER LIMB: ICD-10-CM

## 2019-12-13 DIAGNOSIS — K64.5 THROMBOSED EXTERNAL HEMORRHOIDS: Primary | ICD-10-CM

## 2019-12-13 PROCEDURE — 46320 REMOVAL OF HEMORRHOID CLOT: CPT | Performed by: INTERNAL MEDICINE

## 2019-12-13 PROCEDURE — 99213 OFFICE O/P EST LOW 20 MIN: CPT | Mod: 25 | Performed by: INTERNAL MEDICINE

## 2019-12-13 RX ORDER — DOXYCYCLINE 100 MG/1
100 CAPSULE ORAL 2 TIMES DAILY
Qty: 20 CAPSULE | Refills: 0 | Status: SHIPPED | OUTPATIENT
Start: 2019-12-13 | End: 2019-12-23

## 2019-12-13 ASSESSMENT — MIFFLIN-ST. JEOR: SCORE: 1762.22

## 2019-12-13 NOTE — PROGRESS NOTES
Subjective     Dain Keys is a 39 year old male who presents to clinic today for the following health issues:    HPI   Hemorrhoids/Patient states he has a swollen lymph node on the right side of his groin, feels like he has a pulled muscle in his left thigh area, some discomfort in his right testicle, like he was kicked by a mule.  Onset: 12/05/19    Description:    Pain: YES  Itching: no, burning sensation    Accompanying Signs & Symptoms:  Blood streaked toilet paper: no   Blood in stool: no   Changes in stool pattern: YES- constipation, looks like it's going through an obstruction    History:   Any previous GI studies done:none  Family History of colon cancer: no     Precipitating factors:   Recently on vacation in the [place]    Alleviating factors:  Preparation H, helped with pain but didn't reduce swelling    Therapies Tried and outcome: preparation H    39-year-old gentleman comes in with 2 issues.  He has noticed the lump in the right groin that is tender to touch and in the last day or 2 it seems like it has gotten smaller.  He had been to the [place] for vacation in the symptoms started while he was there.  It was about a week and a half ago.  Also while there he had hemorrhoid that started bothering him.  It is painful and he can feel it.  It is been painful to defecate.  Patient is .  Having some marital issue so has not been sexually active with his wife or any other partner.    Patient Active Problem List   Diagnosis     CARDIOVASCULAR SCREENING; LDL GOAL LESS THAN 160     Other seborrheic keratosis right posterior thorax      Anxiety     Raynaud's disease without gangrene     Primary focal hyperhidrosis, palms     Tobacco use disorder     Past Surgical History:   Procedure Laterality Date     HC TOOTH EXTRACTION W/FORCEP         Social History     Tobacco Use     Smoking status: Current Every Day Smoker     Packs/day: 0.25     Years: 17.00     Pack years: 4.25      Types: Cigarettes     Smokeless tobacco: Never Used   Substance Use Topics     Alcohol use: Yes     Comment: rarely      Family History   Problem Relation Age of Onset     Depression/Anxiety Father      Diabetes Maternal Grandfather      Cerebrovascular Disease Maternal Grandfather      Cardiovascular Maternal Grandfather      Diabetes Maternal Aunt      Coronary Artery Disease No family hx of      Hypertension No family hx of      Hyperlipidemia No family hx of      Breast Cancer No family hx of      Cancer - colorectal No family hx of      Ovarian Cancer No family hx of      Prostate Cancer No family hx of      Anesthesia Reaction No family hx of      Thyroid Disease No family hx of      Asthma No family hx of      Osteoporosis No family hx of      Chemical Addiction No family hx of      Known Genetic Syndrome No family hx of          Current Outpatient Medications   Medication Sig Dispense Refill     doxycycline hyclate (VIBRAMYCIN) 100 MG capsule Take 1 capsule (100 mg) by mouth 2 times daily for 10 days 20 capsule 0     Allergies   Allergen Reactions     Ceclor [Cefaclor]      Septra [Sulfamethoxazole W/Trimethoprim]          Reviewed and updated as needed this visit by Provider         Review of Systems   ROS COMP: Constitutional, HEENT, cardiovascular, pulmonary, GI, , musculoskeletal, neuro, skin, endocrine and psych systems are negative, except as otherwise noted.      Objective    /70 (BP Location: Right arm, Patient Position: Sitting, Cuff Size: Adult Regular)   Pulse 89   Temp 98.4  F (36.9  C) (Temporal)   Ht 1.829 m (6')   Wt 80.9 kg (178 lb 6.4 oz)   SpO2 100%   BMI 24.20 kg/m    Body mass index is 24.2 kg/m .  Physical Exam   GENERAL: healthy, alert and no distress  NECK: no adenopathy, no asymmetry, masses, or scars and thyroid normal to palpation  ABDOMEN: soft, nontender, no hepatosplenomegaly, no masses and bowel sounds normal  ABDOMEN: Soft bowel sounds are active.  Inguinal region  examination reveals a discrete about 1 to 1.5 cm palpable tender lymph node in the medial inguinal region on the right.   (male): normal male genitalia without lesions or urethral discharge, no hernia  RECTAL (male):  Anal examination reveals external thrombosed hemorrhoid at 5 o'clock position.  Digital rectal exam otherwise negative.  Skin; examination is negative no lesions in the right lower extremity noted.  MS: no gross musculoskeletal defects noted, no edema    Diagnostic Test Results:  Labs reviewed in Epic        Assessment & Plan     1.  Thrombosed external hemorrhoid.  Treatment options discussed.  Patient wanted some relief of pain so agreed to martine the hemorrhoid.  Appropriate written consent obtained.  Betadine applied.  1 mL of 1% Xylocaine used as local anesthesia to infiltrate the skin.  Then using #15 knife incision made and thrombosed clot removed.  Patient advised to do sitz bath at home.  Wash with soap and water.  2.  Right inguinal lymphadenitis.  Etiology unclear.  Does not appear to be an STD type problem.  No lesions seen in the right lower extremity to account for the lymphadenitis.  Will treat with doxycycline 100 mg twice daily if not better he will get back to me.       Tobacco Cessation:   reports that he has been smoking cigarettes. He has a 4.25 pack-year smoking history. He has never used smokeless tobacco.            No follow-ups on file.    Arnoldo Up MD  [facility]

## 2020-03-10 ENCOUNTER — HEALTH MAINTENANCE LETTER (OUTPATIENT)
Age: 40
End: 2020-03-10

## 2020-07-25 ENCOUNTER — OFFICE VISIT (OUTPATIENT)
Dept: URGENT CARE | Facility: URGENT CARE | Age: 40
End: 2020-07-25
Payer: COMMERCIAL

## 2020-07-25 VITALS
OXYGEN SATURATION: 100 % | WEIGHT: 186 LBS | TEMPERATURE: 96.5 F | HEART RATE: 77 BPM | DIASTOLIC BLOOD PRESSURE: 73 MMHG | BODY MASS INDEX: 25.23 KG/M2 | SYSTOLIC BLOOD PRESSURE: 115 MMHG | RESPIRATION RATE: 16 BRPM

## 2020-07-25 DIAGNOSIS — I88.9 CERVICAL LYMPHADENITIS: ICD-10-CM

## 2020-07-25 DIAGNOSIS — M54.9 UPPER BACK PAIN ON RIGHT SIDE: Primary | ICD-10-CM

## 2020-07-25 PROCEDURE — 99204 OFFICE O/P NEW MOD 45 MIN: CPT | Performed by: PHYSICIAN ASSISTANT

## 2020-07-25 RX ORDER — METHOCARBAMOL 500 MG/1
500 TABLET, FILM COATED ORAL 4 TIMES DAILY PRN
Qty: 30 TABLET | Refills: 0 | Status: SHIPPED | OUTPATIENT
Start: 2020-07-25 | End: 2021-12-20

## 2020-07-25 RX ORDER — PREDNISONE 20 MG/1
20 TABLET ORAL 2 TIMES DAILY
Qty: 10 TABLET | Refills: 0 | Status: SHIPPED | OUTPATIENT
Start: 2020-07-25 | End: 2020-07-30

## 2020-07-25 RX ORDER — NAPROXEN 500 MG/1
500 TABLET ORAL 2 TIMES DAILY WITH MEALS
Qty: 30 TABLET | Refills: 0 | Status: SHIPPED | OUTPATIENT
Start: 2020-07-25 | End: 2020-08-09

## 2020-07-25 ASSESSMENT — ENCOUNTER SYMPTOMS
BACK PAIN: 1
SINUS PRESSURE: 0
WOUND: 0
CONSTITUTIONAL NEGATIVE: 1
NECK PAIN: 1
PALPITATIONS: 0
ARTHRALGIAS: 1
WHEEZING: 0
SHORTNESS OF BREATH: 0
CHEST TIGHTNESS: 0
SORE THROAT: 0
MYALGIAS: 1
FEVER: 0
COUGH: 0
CHILLS: 0
SINUS PAIN: 0
COLOR CHANGE: 0
RHINORRHEA: 0
ADENOPATHY: 1
NECK STIFFNESS: 0
FATIGUE: 0
JOINT SWELLING: 0

## 2020-07-25 ASSESSMENT — PAIN SCALES - GENERAL: PAINLEVEL: EXTREME PAIN (8)

## 2020-07-25 NOTE — PROGRESS NOTES
Subjective   Dain Keys is a 39 year old male who presents to clinic today for the following health issues:  HPI   Musculoskeletal problem/pain    Duration: few weeks    Description  Location: R upper back and neck    Intensity:  moderate    Accompanying signs and symptoms:  Some radiation into his R arm at times with numbness but no tingling or weakness.  No bladder or bowel dysfunction.  No swelling, redness, drainage or fevers.  R hand dominant.      History  Previous similar problem: no   Previous evaluation:  none    Precipitating or alleviating factors:  Trauma or overuse: YES, possibly as he is a  and carries heavy trays at times  Aggravating factors include: palpation, movement    Therapies tried and outcome: rest/inactivity, heat, immobilization with minimal relief    2) Also noticed swollen lymph node behind his L ear for the past 2days.  No ear pain, URI symptoms or fevers.  No weight loss.      Patient Active Problem List   Diagnosis     CARDIOVASCULAR SCREENING; LDL GOAL LESS THAN 160     Other seborrheic keratosis right posterior thorax      Anxiety     Raynaud's disease without gangrene     Primary focal hyperhidrosis, palms     Tobacco use disorder     Past Surgical History:   Procedure Laterality Date     HC TOOTH EXTRACTION W/FORCEP         Social History     Tobacco Use     Smoking status: Current Every Day Smoker     Packs/day: 0.25     Years: 17.00     Pack years: 4.25     Types: Cigarettes     Smokeless tobacco: Never Used   Substance Use Topics     Alcohol use: Yes     Comment: rarely      Family History   Problem Relation Age of Onset     Depression/Anxiety Father      Diabetes Maternal Grandfather      Cerebrovascular Disease Maternal Grandfather      Cardiovascular Maternal Grandfather      Diabetes Maternal Aunt      Coronary Artery Disease No family hx of      Hypertension No family hx of      Hyperlipidemia No family hx of      Breast Cancer No family hx of      Cancer -  colorectal No family hx of      Ovarian Cancer No family hx of      Prostate Cancer No family hx of      Anesthesia Reaction No family hx of      Thyroid Disease No family hx of      Asthma No family hx of      Osteoporosis No family hx of      Chemical Addiction No family hx of      Known Genetic Syndrome No family hx of          No current outpatient medications on file.     Allergies   Allergen Reactions     Ceclor [Cefaclor]      Septra [Sulfamethoxazole W/Trimethoprim]        Reviewed and updated as needed this visit by Provider       Review of Systems   Constitutional: Negative.  Negative for chills, fatigue and fever.   HENT: Negative for congestion, ear pain, rhinorrhea, sinus pressure, sinus pain and sore throat.    Respiratory: Negative for cough, chest tightness, shortness of breath and wheezing.    Cardiovascular: Negative for chest pain, palpitations and peripheral edema.   Musculoskeletal: Positive for arthralgias, back pain, myalgias and neck pain. Negative for gait problem, joint swelling and neck stiffness.   Skin: Negative for color change, pallor, rash and wound.   Hematological: Positive for adenopathy.   All other systems reviewed and are negative.           Objective    /73 (BP Location: Left arm, Patient Position: Sitting, Cuff Size: Adult Large)   Pulse 77   Temp 96.5  F (35.8  C) (Tympanic)   Resp 16   Wt 84.4 kg (186 lb)   SpO2 100%   BMI 25.23 kg/m    Body mass index is 25.23 kg/m .  Physical Exam  Vitals signs and nursing note reviewed.   Constitutional:       General: He is not in acute distress.     Appearance: Normal appearance. He is normal weight. He is not ill-appearing.   HENT:      Head: Normocephalic and atraumatic.      Ears:      Comments: TMs are intact without any erythema or bulging bilaterally.  Airway is patent.     Nose: Nose normal.      Mouth/Throat:      Lips: Pink.      Mouth: Mucous membranes are moist.      Pharynx: Oropharynx is clear. Uvula midline. No  pharyngeal swelling, oropharyngeal exudate, posterior oropharyngeal erythema or uvula swelling.      Tonsils: No tonsillar exudate or tonsillar abscesses.   Eyes:      General: No scleral icterus.     Conjunctiva/sclera: Conjunctivae normal.      Pupils: Pupils are equal, round, and reactive to light.   Neck:      Musculoskeletal: Normal range of motion and neck supple.      Thyroid: No thyromegaly.   Cardiovascular:      Rate and Rhythm: Normal rate and regular rhythm.      Pulses: Normal pulses.      Heart sounds: Normal heart sounds, S1 normal and S2 normal. No murmur. No friction rub. No gallop.    Pulmonary:      Effort: Pulmonary effort is normal. No tachypnea, accessory muscle usage, respiratory distress or retractions.      Breath sounds: Normal breath sounds and air entry. No stridor. No decreased breath sounds, wheezing, rhonchi or rales.   Musculoskeletal:      Cervical back: Normal. He exhibits normal range of motion, no tenderness, no bony tenderness, no swelling, no edema, no deformity, no laceration and no spasm.      Thoracic back: He exhibits tenderness and spasm. He exhibits normal range of motion, no bony tenderness, no swelling, no edema, no deformity, no laceration and normal pulse.   Lymphadenopathy:      Head:      Right side of head: No preauricular or posterior auricular adenopathy.      Left side of head: Posterior auricular adenopathy present. No preauricular adenopathy.      Cervical: No cervical adenopathy.   Skin:     General: Skin is warm and dry.      Findings: No rash.   Neurological:      Mental Status: He is alert and oriented to person, place, and time.   Psychiatric:         Mood and Affect: Mood normal.         Behavior: Behavior normal.         Thought Content: Thought content normal.         Judgment: Judgment normal.             Assessment & Plan   Upper back pain on right side:   No trauma or injuries.  Most likely pinched nerve vs strain/sprain vs spasm.  Recommend RICE and  will give [id number] and methocarbamol and naproxen prn pain. Discussed risks and benefits of medication along with side effects, direction for use.  No driving or operating machinery due to sedation. Will also send to orthopedics for further evaluation and management.  Recheck in clinic if symptoms worsen or if symptoms do not improve.   -     predniSONE (DELTASONE) 20 MG tablet; Take 1 tablet (20 mg) by mouth 2 times daily for 5 days  -     methocarbamol (ROBAXIN) 500 MG tablet; Take 1 tablet (500 mg) by mouth 4 times daily as needed for muscle spasms  -     naproxen (NAPROSYN) 500 MG tablet; Take 1 tablet (500 mg) by mouth 2 times daily (with meals) for 15 days  -     Orthopedic & Spine  Referral; Future    Cervical lymphadenitis:  Will treat with [id number] and take with food/probiotics to minimize GI upset.  Allergy to ceclor but has been able to tolerate amoxicillin/PCN.  Recommend tylenol/ibuprofen prn pain/fever.   Will send to ENT if no improvement.    -     amoxicillin-clavulanate (AUGMENTIN) 875-125 MG tablet; Take 1 tablet by mouth 2 times daily for 10 days  -     OTOLARYNGOLOGY REFERRAL           Kelly Gibson PA-C  [facility]

## 2020-08-31 ENCOUNTER — OFFICE VISIT (OUTPATIENT)
Dept: PEDIATRICS | Facility: CLINIC | Age: 40
End: 2020-08-31
Payer: COMMERCIAL

## 2020-08-31 VITALS
WEIGHT: 186.2 LBS | BODY MASS INDEX: 24.68 KG/M2 | SYSTOLIC BLOOD PRESSURE: 117 MMHG | HEART RATE: 92 BPM | HEIGHT: 73 IN | DIASTOLIC BLOOD PRESSURE: 77 MMHG | TEMPERATURE: 98.7 F | OXYGEN SATURATION: 99 %

## 2020-08-31 DIAGNOSIS — L03.90 CELLULITIS, UNSPECIFIED CELLULITIS SITE: ICD-10-CM

## 2020-08-31 DIAGNOSIS — L02.92 FURUNCLE OF SKIN OR SUBCUTANEOUS TISSUE: Primary | ICD-10-CM

## 2020-08-31 PROCEDURE — 10060 I&D ABSCESS SIMPLE/SINGLE: CPT | Performed by: NURSE PRACTITIONER

## 2020-08-31 PROCEDURE — 99213 OFFICE O/P EST LOW 20 MIN: CPT | Mod: 25 | Performed by: NURSE PRACTITIONER

## 2020-08-31 RX ORDER — DOXYCYCLINE HYCLATE 100 MG
100 TABLET ORAL 2 TIMES DAILY
Qty: 20 TABLET | Refills: 0 | Status: SHIPPED | OUTPATIENT
Start: 2020-08-31 | End: 2020-09-10

## 2020-08-31 ASSESSMENT — MIFFLIN-ST. JEOR: SCORE: 1813.48

## 2020-08-31 NOTE — PROGRESS NOTES
"Subjective     Dain Keys is a 39 year old male who presents to clinic today for the following health issues:    HPI       Acute Illness  Acute illness concerns: ***  Onset/Duration: ***  Symptoms:  Fever: {.:524605::\"no\"}  Chills/Sweats: {.:092476::\"no\"}  Headache (location?): {.:795202::\"no\"}  Sinus Pressure: {.:011600::\"no\"}  Conjunctivitis:  {.:326160::\"no\"}  Ear Pain: {.:955278::\"no\"}  Rhinorrhea: {.:325302::\"no\"}  Congestion: {.:446346::\"no\"}  Sore Throat: {.:023537::\"no\"}  Cough: {.:234562::\"no\"}  Wheeze: {.:893545::\"no\"}  Decreased Appetite: {.:916727::\"no\"}  Nausea: {.:216287::\"no\"}  Vomiting: {.:985349::\"no\"}  Diarrhea: {.:231188::\"no\"}  Dysuria/Freq.: {.:232478::\"no\"}  Dysuria or Hematuria: {.:985723::\"no\"}  Fatigue/Achiness: {.:136488::\"no\"}  Sick/Strep Exposure: {.:734621::\"no\"}  Therapies tried and outcome: {NONEORCHOOSE:405503::\"None\"}  {additonal problems for provider to add (Optional):416768}    Review of Systems   {ROS COMP (Optional):065400}      Objective    There were no vitals taken for this visit.  There is no height or weight on file to calculate BMI.  Physical Exam   {Exam List (Optional):466720}    {Diagnostic Test Results (Optional):289288}        {PROVIDER CHARTING PREFERENCE:368892}    "

## 2020-08-31 NOTE — PATIENT INSTRUCTIONS
PLAN:   1.   Symptomatic therapy suggested: warm compresses twice a day.  Also use ibuprofen or aleve   2.  Orders Placed This Encounter   Medications     doxycycline hyclate (VIBRA-TABS) 100 MG tablet     Sig: Take 1 tablet (100 mg) by mouth 2 times daily for 10 days     Dispense:  20 tablet     Refill:  0     Orders Placed This Encounter   Procedures     DRAIN SKIN ABSCESS SIMPLE/SINGLE       3. Patient needs to follow up in if no improvement,or sooner if worsening of symptoms or other symptoms develop.           Patient Education     Understanding Carbuncles    A carbuncle is a painful boil under the skin. It happens when a group of hair follicles become infected. Follicles are the tiny holes from which hair grows out of your skin.  How to say it  Fede   What causes carbuncles?  A carbuncle is caused by an infection with the bacteria Staphylococcus aureus. They are common on areas of the body where friction and sweat occur. They usually appear on the back of the neck, back, and thighs. This type of infection can also happen when the skin is injured, such as by a cut or bug bite.  The bacteria that causes carbuncles can spread easily from person to person. People at higher risk for boils are those with diabetes or a weak immune system. Drug users who use needles are also more likely to get them.  Symptoms of carbuncles  A carbuncle starts as a small painful bump. But it can grow quickly. It may become:    Red    Swollen    Tender  Carbuncles may ooze pus. They may also cause fever and a general feeling of illness.  Treatment for carbuncles  A carbuncle may heal on its own in a few weeks. But the pus within it needs to come out first. Treatment options include:    Warm compress. Putting a warm, wet washcloth on the boil will help the pus drain out. You should never try to pop a boil. That can cause the infection to spread.    Surgical drainage. If the boil doesn t drain on its own, your healthcare provider  may need to cut into it.    Antibiotics. In some cases, oral antibiotics may be prescribed to fight the infection, especially if the carbuncle returns. You will likely have to take the medicine for 5 to 7 days. You may need to take it longer for a severe case.    Good hygiene. Proper handwashing can prevent boils from spreading and coming back. Also wash things that have been in contact with the carbuncle in hot water. This includes items such as clothing and towels.  Complications of carbuncles  The main complication of a carbuncle is the spreading of the infection. The bacteria can infect the heart and bone. It can also lead to septic shock, an infection of the entire body.  When to call your healthcare provider  Call your healthcare provider right away if you have any of these:    Fever of 100.4 F (38 C) or higher, or as directed    Redness, swelling, or fluid leaking from a carbuncle that gets worse    Pain that gets worse    Symptoms that don t get better, or get worse    New symptoms   Date Last Reviewed: 5/1/2016 2000-2019 [employer]. [address] [place] [address], [place] [address]. All rights reserved. This information is not intended as a substitute for professional medical care. Always follow your healthcare professional's instructions.

## 2020-08-31 NOTE — PROGRESS NOTES
"Subjective     Dain Keys is a 39 year old male who presents to clinic today for the following health issues:    HPI     Pt has bump or cyst like behind right ear and also has one Right buttocks hip area. They are red inflamed, painful. Has had over 3-4 weeks for one on hip and the one behind ear 1 week. No fevers, a little drainage.    HPI  Patient presents with tender nodules; 1 behind right ear, present for 1 week and 1 on right lateral hip/buttock present for 3 weeks. They have remained persistently enlarged. The one on the hip is more painful than the 1 behind the ear. No recent travel, cuts or scrapes. However, does work outdoors and get sweaty. Wears headphones. No oozing from posterior ear nodule but some oozing from nodule on hip. He reports squeezing it, somewhat aggressively to see if he could get it to drain with little success. No known history of any skin infections.      Review of Systems   CONSTITUTIONAL:NEGATIVE  for arthralgias, chills, fatigue, fever and sweats  INTEGUMENTARY/SKIN: POSITIVE for redness and lump to posterior right ear and right hip  RESP:NEGATIVE for  SOB/dyspnea  CV: NEGATIVE for chest pain/pressure  MUSCULOSKELETAL: POSITIVE  For tenderness near site of lump to right hip  NEURO: Some numbness in skin surrounding ear and hip nodules  HEME/ALLERGY/IMMUNE: NEGATIVE for night sweats and weight loss      Objective    /77 (BP Location: Right arm, Patient Position: Sitting, Cuff Size: Adult Regular)   Pulse 92   Temp 98.7  F (37.1  C) (Temporal)   Ht 1.854 m (6' 1\")   Wt 84.5 kg (186 lb 3.2 oz)   SpO2 99%   BMI 24.57 kg/m    There is no height or weight on file to calculate BMI.  Physical Exam   GENERAL: healthy, alert and no distress  RESP: lungs clear to auscultation - no rales, rhonchi or wheezes  CV: regular rates and rhythm, normal S1 S2, no S3 or S4 and no murmur, click or rub  SKIN:  9mm nodule with central scaling and surrounding erythema in the inferior " postauricular region; approximately 15mm boil located on right lateral buttocks with surrounding erythema and central ecchymosis; indurated with some central fluctuance  OBJECTIVE:  Fluctuant abscess noted on the right buttock.  Size 1.5 cm. There is mild  surrounding induration, erythema and tenderness. Afebrile.    ASSESSMENT:  Sebaceous cyst with abscess formation.    PLAN:  After informed consent was obtained, using Betadine for cleansing   and 1% Lidocaine  with epinephrine for anesthetic, with sterile   technique, an incision was made into the abscess cavity which was   then drained of very scant purulent material.  Procedure well   tolerated.  Dressing applied and wound care instructions provided.   May remove packing in 24 hours, and continue frequent warm tub soaks   until abscess resolves. Return to clinic prn for pain, increased   swelling or fever.    LYMPH: superficial cervical node enlargement just inferior to nodule; mobile    Diagnostic Test Results:  Labs reviewed in Epic  none           Assessment & Plan     Dain was seen today for lesion.    Diagnoses and all orders for this visit:    Furuncle of skin or subcutaneous tissue  -     doxycycline hyclate (VIBRA-TABS) 100 MG tablet; Take 1 tablet (100 mg) by mouth 2 times daily for 10 days  -     DRAIN SKIN ABSCESS SIMPLE/SINGLE    Cellulitis, unspecified cellulitis site right post auricular    take antibiotics as directed.  If new, worsening or persistent symptoms, the patient is to call or return for a recheck.    Abscess to right hip lanced with scant amount of purulent drainage. Cleansed and covered with gauze and tape.  Nodule behind right ear without indication for I&D. Initiate doxycycline.     Tobacco Cessation:   reports that he has been smoking cigarettes. He has a 4.25 pack-year smoking history. He has never used smokeless tobacco.    See Patient Instructions  Patient Instructions     PLAN:   1.   Symptomatic therapy suggested: warm  compresses twice a day.  Also use ibuprofen or aleve   2.  Orders Placed This Encounter   Medications     doxycycline hyclate (VIBRA-TABS) 100 MG tablet     Sig: Take 1 tablet (100 mg) by mouth 2 times daily for 10 days     Dispense:  20 tablet     Refill:  0     Orders Placed This Encounter   Procedures     DRAIN SKIN ABSCESS SIMPLE/SINGLE       3. Patient needs to follow up in if no improvement,or sooner if worsening of symptoms or other symptoms develop.     No follow-ups on file.    TORIBIO George CNP  [facility]    Amber Scheierl, DNP Student  I have examined the patient and agree with written evaluation. Assessment and plan presented by this provider.   Josie Ventura CNP

## 2020-12-20 ENCOUNTER — HEALTH MAINTENANCE LETTER (OUTPATIENT)
Age: 40
End: 2020-12-20

## 2021-04-24 ENCOUNTER — HEALTH MAINTENANCE LETTER (OUTPATIENT)
Age: 41
End: 2021-04-24

## 2021-10-03 ENCOUNTER — HEALTH MAINTENANCE LETTER (OUTPATIENT)
Age: 41
End: 2021-10-03

## 2021-12-08 ENCOUNTER — NURSE TRIAGE (OUTPATIENT)
Dept: NURSING | Facility: CLINIC | Age: 41
End: 2021-12-08
Payer: COMMERCIAL

## 2021-12-08 NOTE — TELEPHONE ENCOUNTER
"Patient seen 8- for lump on right buttock/hip and had it opened, drained.  \"Carved it out but found nothing.\"    Patient reporting it hasn't fully healed. Denies fever, spreading redness, pain, \"but the hole is still there.\"    For wound that hasn't healed, disposition to see provider within three days.  Patient agrees with plan and transferred to schedulers.    Ellyn Dolan RN  [facility]    COVID 19 Nurse Triage Plan/Patient Instructions    Please be aware that novel coronavirus (COVID-19) may be circulating in the community. If you develop symptoms such as fever, cough, or SOB or if you have concerns about the presence of another infection including coronavirus (COVID-19), please contact your health care provider or visit [email or web address].     Disposition/Instructions    In-Person Visit with provider recommended. Reference Visit Selection Guide.    Thank you for taking steps to prevent the spread of this virus.  o Limit your contact with others.  o Wear a simple mask to cover your cough.  o Wash your hands well and often.    Resources    [facility]: About COVID-19: [email or web address]    CDC: What to Do If You're Sick: www.cdc.gov/coronavirus/2019-ncov/about/steps-when-sick.html    CDC: Ending Home Isolation: www.cdc.gov/coronavirus/2019-ncov/hcp/disposition-in-home-patients.html     CDC: Caring for Someone: www.cdc.gov/coronavirus/2019-ncov/if-you-are-sick/care-for-someone.html     [facility]: Interim Guidance for Hospital Discharge to Home: www.health.[facility].[place]/diseases/coronavirus/hcp/hospdischarge.pdf    [facility] clinical trials (COVID-19 research studies): clinicalaffairs.[email or web address]/um-clinical-trials     Below are the COVID-19 hotlines at the [place] Department of [facility] ([facility]). Interpreters are available.   o For health questions: Call 838-758-2676 or 1-754.221.1370 (7 a.m. to 7 p.m.)  o For questions about schools and childcare: Call " 988.113.2032 or 1-412.696.6470 (7 a.m. to 7 p.m.)     Reason for Disposition    After 14 days and wound isn't healed    Additional Information    Negative: High pressure injection injury (e.g., from paint gun, usually work-related)    Negative: Skin loss involves more than 10% of surface area (Note: the palm of the hand = 1%)    Negative: Skin is split open or gaping (length > 1/2 inch or 12 mm on the skin, 1/4 inch or 6 mm on the face)    Negative: Bleeding won't stop after 10 minutes of direct pressure (using correct technique)    Negative: Cut or scrape is very deep (e.g., can see bone or tendons)    Negative: Dirt in the wound and not removed after 15 minutes of scrubbing    Negative: Wound causes numbness (i.e., loss of sensation)    Negative: Shock suspected (e.g., cold/pale/clammy skin, too weak to stand, low BP, rapid pulse)    Negative: Cut on the neck, chest, back, or abdomen that may go deep (e.g., stab wound or other suspicious penetrating injury)    Negative: Major bleeding (actively dripping or spurting) that can't be stopped    Negative: Amputation    Negative: Sounds like a life-threatening emergency to the triager    Negative: Wound causes weakness (i.e., decreased ability to move hand, finger, toe)    Negative: Sounds like a serious injury to the triager    Negative: Looks infected (fever, spreading redness, pus, or red streak)    Negative: Raised bruise with size > 2 inches (5 cm) that is getting bigger    Negative: SEVERE pain (e.g., excruciating)    Negative: No prior tetanus shots (or is not fully vaccinated) and any wound (e.g., cut or scrape)    Negative: HIV positive or severe immunodeficiency (severely weak immune system) and DIRTY cut or scrape    Negative: Patient wants to be seen    Negative: Has diabetes (diabetes mellitus) and has minor cut or scratch on foot    Negative: Suspicious history for the injury    Negative: Last tetanus shot > 5 years ago and DIRTY cut or scrape    Negative:  Last tetanus shot >10 years ago and CLEAN cut or scrape    Protocols used: SKIN INJURY-A-OH

## 2021-12-20 ENCOUNTER — OFFICE VISIT (OUTPATIENT)
Dept: FAMILY MEDICINE | Facility: CLINIC | Age: 41
End: 2021-12-20
Payer: COMMERCIAL

## 2021-12-20 VITALS
RESPIRATION RATE: 18 BRPM | DIASTOLIC BLOOD PRESSURE: 75 MMHG | WEIGHT: 189.4 LBS | SYSTOLIC BLOOD PRESSURE: 119 MMHG | HEART RATE: 82 BPM | TEMPERATURE: 98.5 F | OXYGEN SATURATION: 100 % | HEIGHT: 73 IN | BODY MASS INDEX: 25.1 KG/M2

## 2021-12-20 DIAGNOSIS — E04.9 ENLARGED THYROID: ICD-10-CM

## 2021-12-20 DIAGNOSIS — Z13.220 SCREENING FOR HYPERLIPIDEMIA: ICD-10-CM

## 2021-12-20 DIAGNOSIS — L90.5 SCAR CONDITION AND FIBROSIS OF SKIN: Primary | ICD-10-CM

## 2021-12-20 DIAGNOSIS — Z23 HIGH PRIORITY FOR 2019-NCOV VACCINE: ICD-10-CM

## 2021-12-20 LAB
CHOLEST SERPL-MCNC: 128 MG/DL
FASTING STATUS PATIENT QL REPORTED: NO
HDLC SERPL-MCNC: 43 MG/DL
LDLC SERPL CALC-MCNC: 71 MG/DL
NONHDLC SERPL-MCNC: 85 MG/DL
TRIGL SERPL-MCNC: 71 MG/DL
TSH SERPL DL<=0.005 MIU/L-ACNC: 0.61 MU/L (ref 0.4–4)

## 2021-12-20 PROCEDURE — 84443 ASSAY THYROID STIM HORMONE: CPT | Performed by: NURSE PRACTITIONER

## 2021-12-20 PROCEDURE — 80061 LIPID PANEL: CPT | Performed by: NURSE PRACTITIONER

## 2021-12-20 PROCEDURE — 36415 COLL VENOUS BLD VENIPUNCTURE: CPT | Performed by: NURSE PRACTITIONER

## 2021-12-20 PROCEDURE — 91306 COVID-19,PF,MODERNA (18+ YRS BOOSTER .25ML): CPT | Performed by: NURSE PRACTITIONER

## 2021-12-20 PROCEDURE — 99213 OFFICE O/P EST LOW 20 MIN: CPT | Performed by: NURSE PRACTITIONER

## 2021-12-20 PROCEDURE — 0064A COVID-19,PF,MODERNA (18+ YRS BOOSTER .25ML): CPT | Performed by: NURSE PRACTITIONER

## 2021-12-20 ASSESSMENT — PAIN SCALES - GENERAL: PAINLEVEL: NO PAIN (0)

## 2021-12-20 ASSESSMENT — MIFFLIN-ST. JEOR: SCORE: 1817.99

## 2022-02-22 ENCOUNTER — OFFICE VISIT (OUTPATIENT)
Dept: DERMATOLOGY | Facility: CLINIC | Age: 42
End: 2022-02-22
Attending: NURSE PRACTITIONER
Payer: COMMERCIAL

## 2022-02-22 VITALS — OXYGEN SATURATION: 97 % | HEART RATE: 89 BPM | DIASTOLIC BLOOD PRESSURE: 71 MMHG | SYSTOLIC BLOOD PRESSURE: 111 MMHG

## 2022-02-22 DIAGNOSIS — L90.5 SCAR CONDITION AND FIBROSIS OF SKIN: ICD-10-CM

## 2022-02-22 DIAGNOSIS — L72.0 EPIDERMAL CYST: ICD-10-CM

## 2022-02-22 DIAGNOSIS — L90.5 SCAR: Primary | ICD-10-CM

## 2022-02-22 DIAGNOSIS — L82.1 SEBORRHEIC KERATOSIS: ICD-10-CM

## 2022-02-22 DIAGNOSIS — L81.4 LENTIGO: ICD-10-CM

## 2022-02-22 PROCEDURE — 99243 OFF/OP CNSLTJ NEW/EST LOW 30: CPT | Performed by: DERMATOLOGY

## 2022-02-22 NOTE — LETTER
2/22/2022         RE: Dain Keys  [address] [facility] [address]        Dear Colleague,    Thank you for referring your patient, Dain Keys, to the [facility]. Please see a copy of my visit note below.    Dain Keys , a 41 year old year old male patient, I was asked to see by WOO Loving for sca annetta right hip.  Patient states this has been present for a wihle.  Patient reports the following symptoms:  tender .  Patient reports the following previous treatments excision of something.  .  Patient reports the following modifying factors none.  Associated symptoms: none.  Patient has no other skin complaints today.  Remainder of the HPI, Meds, PMH, Allergies, FH, and SH was reviewed in chart.      Past Medical History:   Diagnosis Date     No significant active problems        Past Surgical History:   Procedure Laterality Date     HC TOOTH EXTRACTION W/FORCEP          Family History   Problem Relation Age of Onset     Depression/Anxiety Father      Diabetes Maternal Grandfather      Cerebrovascular Disease Maternal Grandfather      Cardiovascular Maternal Grandfather      Diabetes Maternal Aunt      Coronary Artery Disease No family hx of      Hypertension No family hx of      Hyperlipidemia No family hx of      Breast Cancer No family hx of      Cancer - colorectal No family hx of      Ovarian Cancer No family hx of      Prostate Cancer No family hx of      Anesthesia Reaction No family hx of      Thyroid Disease No family hx of      Asthma No family hx of      Osteoporosis No family hx of      Chemical Addiction No family hx of      Known Genetic Syndrome No family hx of        Social History     Socioeconomic History     Marital status:      Spouse name: Not on file     Number of children: Not on file     Years of education: Not on file     Highest education level: Not on file   Occupational History     Not on file   Tobacco Use     Smoking  status: Current Every Day Smoker     Packs/day: 0.25     Years: 17.00     Pack years: 4.25     Types: Cigarettes     Smokeless tobacco: Never Used   Substance and Sexual Activity     Alcohol use: Yes     Comment: rarely      Drug use: Yes     Comment: smokes marijuana occassionally     Sexual activity: Yes     Partners: Female     Comment:  one partner   Other Topics Concern     Parent/sibling w/ CABG, MI or angioplasty before 65F 55M? Not Asked   Social History Narrative     Not on file     Social Determinants of Health     Financial Resource Strain: Not on file   Food Insecurity: Not on file   Transportation Needs: Not on file   Physical Activity: Not on file   Stress: Not on file   Social Connections: Not on file   Intimate Partner Violence: Not on file   Housing Stability: Not on file       No outpatient encounter medications on file as of 2/22/2022.     No facility-administered encounter medications on file as of 2/22/2022.             Review Of Systems  Skin: As above  Eyes: negative  Ears/Nose/Throat: negative  Respiratory: No shortness of breath, dyspnea on exertion, cough, or hemoptysis  Cardiovascular: negative  Gastrointestinal: negative  Genitourinary: negative  Musculoskeletal: negative  Neurologic: negative  Psychiatric: negative  Hematologic/Lymphatic/Immunologic: negative  Endocrine: negative      O:   NAD, WDWN, Alert & Oriented, Mood & Affect wnl, Vitals stable   Here today alone   /71   Pulse 89   SpO2 97%    General appearance mp ii   Vitals stable   Alert, oriented and in no acute distress   R buttocks firm brown scar with comedone  Stuck on papules and brown macules on trunk and ext         Eyes: Conjunctivae/lids:Normal     ENT: Lips, buccal mucosa, tongue: normal    MSK:Normal    Cardiovascular: peripheral edema none    Pulm: Breathing Normal    Neuro/Psych: Orientation:Normal; Mood/Affect:Normal      A/P:  1. Scar and cyst , seborrheic keratosis , lentigo  Excision discussed  with patient   Schedule prn   It was a pleasure speaking to Dain Keys today.  Previous clinic  notes and pertinent laboratory tests were reviewed prior to Dain Keys's visit.  Nature and genetics of benign skin lesions dicussed with patient.  Signs and Symptoms of skin cancer discussed with patient.  Return to clinic next appt        Again, thank you for allowing me to participate in the care of your patient.        Sincerely,        Maik Colorado MD

## 2022-02-22 NOTE — PROGRESS NOTES
Dain OHARA Massimo , a 41 year old year old male patient, I was asked to see by WOO Loving for sca annetta right hip.  Patient states this has been present for a wihle.  Patient reports the following symptoms:  tender .  Patient reports the following previous treatments excision of something.  .  Patient reports the following modifying factors none.  Associated symptoms: none.  Patient has no other skin complaints today.  Remainder of the HPI, Meds, PMH, Allergies, FH, and SH was reviewed in chart.      Past Medical History:   Diagnosis Date     No significant active problems        Past Surgical History:   Procedure Laterality Date     HC TOOTH EXTRACTION W/FORCEP          Family History   Problem Relation Age of Onset     Depression/Anxiety Father      Diabetes Maternal Grandfather      Cerebrovascular Disease Maternal Grandfather      Cardiovascular Maternal Grandfather      Diabetes Maternal Aunt      Coronary Artery Disease No family hx of      Hypertension No family hx of      Hyperlipidemia No family hx of      Breast Cancer No family hx of      Cancer - colorectal No family hx of      Ovarian Cancer No family hx of      Prostate Cancer No family hx of      Anesthesia Reaction No family hx of      Thyroid Disease No family hx of      Asthma No family hx of      Osteoporosis No family hx of      Chemical Addiction No family hx of      Known Genetic Syndrome No family hx of        Social History     Socioeconomic History     Marital status:      Spouse name: Not on file     Number of children: Not on file     Years of education: Not on file     Highest education level: Not on file   Occupational History     Not on file   Tobacco Use     Smoking status: Current Every Day Smoker     Packs/day: 0.25     Years: 17.00     Pack years: 4.25     Types: Cigarettes     Smokeless tobacco: Never Used   Substance and Sexual Activity     Alcohol use: Yes     Comment: rarely      Drug use: Yes     Comment: smokes marijuana  occassionally     Sexual activity: Yes     Partners: Female     Comment:  one partner   Other Topics Concern     Parent/sibling w/ CABG, MI or angioplasty before 65F 55M? Not Asked   Social History Narrative     Not on file     Social Determinants of Health     Financial Resource Strain: Not on file   Food Insecurity: Not on file   Transportation Needs: Not on file   Physical Activity: Not on file   Stress: Not on file   Social Connections: Not on file   Intimate Partner Violence: Not on file   Housing Stability: Not on file       No outpatient encounter medications on file as of 2/22/2022.     No facility-administered encounter medications on file as of 2/22/2022.             Review Of Systems  Skin: As above  Eyes: negative  Ears/Nose/Throat: negative  Respiratory: No shortness of breath, dyspnea on exertion, cough, or hemoptysis  Cardiovascular: negative  Gastrointestinal: negative  Genitourinary: negative  Musculoskeletal: negative  Neurologic: negative  Psychiatric: negative  Hematologic/Lymphatic/Immunologic: negative  Endocrine: negative      O:   NAD, WDWN, Alert & Oriented, Mood & Affect wnl, Vitals stable   Here today alone   /71   Pulse 89   SpO2 97%    General appearance mp ii   Vitals stable   Alert, oriented and in no acute distress   R buttocks firm brown scar with comedone  Stuck on papules and brown macules on trunk and ext         Eyes: Conjunctivae/lids:Normal     ENT: Lips, buccal mucosa, tongue: normal    MSK:Normal    Cardiovascular: peripheral edema none    Pulm: Breathing Normal    Neuro/Psych: Orientation:Normal; Mood/Affect:Normal      A/P:  1. Scar and cyst , seborrheic keratosis , lentigo  Excision discussed with patient   Schedule prn   It was a pleasure speaking to Dain Keys today.  Previous clinic  notes and pertinent laboratory tests were reviewed prior to Dain Keys's visit.  Nature and genetics of benign skin lesions dicussed with patient.  Signs and  Symptoms of skin cancer discussed with patient.  Return to clinic next appt

## 2022-03-08 ENCOUNTER — OFFICE VISIT (OUTPATIENT)
Dept: DERMATOLOGY | Facility: CLINIC | Age: 42
End: 2022-03-08
Payer: COMMERCIAL

## 2022-03-08 VITALS
BODY MASS INDEX: 24.99 KG/M2 | HEART RATE: 90 BPM | DIASTOLIC BLOOD PRESSURE: 82 MMHG | HEIGHT: 73 IN | SYSTOLIC BLOOD PRESSURE: 122 MMHG

## 2022-03-08 DIAGNOSIS — B37.9 CANDIDA ALBICANS INFECTION: Primary | ICD-10-CM

## 2022-03-08 DIAGNOSIS — L90.5 SCAR: ICD-10-CM

## 2022-03-08 DIAGNOSIS — L72.0 RUPTURED EPITHELIAL CYST: ICD-10-CM

## 2022-03-08 PROCEDURE — 99213 OFFICE O/P EST LOW 20 MIN: CPT | Mod: 25 | Performed by: DERMATOLOGY

## 2022-03-08 PROCEDURE — 11403 EXC TR-EXT B9+MARG 2.1-3CM: CPT | Performed by: DERMATOLOGY

## 2022-03-08 PROCEDURE — 13121 CMPLX RPR S/A/L 2.6-7.5 CM: CPT | Performed by: DERMATOLOGY

## 2022-03-08 PROCEDURE — 88305 TISSUE EXAM BY PATHOLOGIST: CPT | Performed by: DERMATOLOGY

## 2022-03-08 RX ORDER — CICLOPIROX OLAMINE 7.7 MG/G
CREAM TOPICAL 2 TIMES DAILY
Qty: 60 G | Refills: 6 | Status: SHIPPED | OUTPATIENT
Start: 2022-03-08 | End: 2022-04-21

## 2022-03-08 NOTE — PROGRESS NOTES
Dain Keys is an extremely pleasant 41 year old year old male patient here today for evaluation and managment of ruptured cyst on right hip.  Today he notes rash in axilla.   .   Patient states this has been present for a while.  Patient reports the following symptoms:  red.  Patient reports the following previous treatments none.  These treatments did not work.  Patient reports the following modifying factors none.  Associated symptoms: none.  Patient has no other skin complaints today.  Remainder of the HPI, Meds, PMH, Allergies, FH, and SH was reviewed in chart.      Past Medical History:   Diagnosis Date     No significant active problems        Past Surgical History:   Procedure Laterality Date     HC TOOTH EXTRACTION W/FORCEP          Family History   Problem Relation Age of Onset     Depression/Anxiety Father      Diabetes Maternal Grandfather      Cerebrovascular Disease Maternal Grandfather      Cardiovascular Maternal Grandfather      Diabetes Maternal Aunt      Coronary Artery Disease No family hx of      Hypertension No family hx of      Hyperlipidemia No family hx of      Breast Cancer No family hx of      Cancer - colorectal No family hx of      Ovarian Cancer No family hx of      Prostate Cancer No family hx of      Anesthesia Reaction No family hx of      Thyroid Disease No family hx of      Asthma No family hx of      Osteoporosis No family hx of      Chemical Addiction No family hx of      Known Genetic Syndrome No family hx of        Social History     Socioeconomic History     Marital status:      Spouse name: Not on file     Number of children: Not on file     Years of education: Not on file     Highest education level: Not on file   Occupational History     Not on file   Tobacco Use     Smoking status: Current Every Day Smoker     Packs/day: 0.25     Years: 17.00     Pack years: 4.25     Types: Cigarettes     Smokeless tobacco: Never Used   Substance and Sexual Activity      "Alcohol use: Yes     Comment: rarely      Drug use: Yes     Comment: smokes marijuana occassionally     Sexual activity: Yes     Partners: Female     Comment:  one partner   Other Topics Concern     Parent/sibling w/ CABG, MI or angioplasty before 65F 55M? Not Asked   Social History Narrative     Not on file     Social Determinants of Health     Financial Resource Strain: Not on file   Food Insecurity: Not on file   Transportation Needs: Not on file   Physical Activity: Not on file   Stress: Not on file   Social Connections: Not on file   Intimate Partner Violence: Not on file   Housing Stability: Not on file       No outpatient encounter medications on file as of 3/8/2022.     No facility-administered encounter medications on file as of 3/8/2022.             O:   NAD, WDWN, Alert & Oriented, Mood & Affect wnl, Vitals stable   Here today alone   /82   Pulse 90   Ht 1.854 m (6' 1\")   BMI 24.99 kg/m     General appearance normal   Vitals stable   Alert, oriented and in no acute distress     L axilla annular red patch with pinpoint pustules  R hip comedone and scar 1.8cm       Eyes: Conjunctivae/lids:Normal     ENT: Lips, buccal mucosa, tongue: normal    MSK:Normal    Cardiovascular: peripheral edema none    Pulm: Breathing Normal    Neuro/Psych: Orientation:Alert and Orientedx3 ; Mood/Affect:normal       A/P:  1. R hip ruptured cyst and scar   Excision and recurrence and scar discussed with patient   2. Axilla candida infection  Loprox twice daily  Return to clinic 2 weeks virtual   It was a pleasure speaking to Dain Keys today.  Previous clinic notes and pertinent laboratory tests were reviewed prior to Dain Keys's visit.  Nature and genetics of benign skin lesions dicussed with patient.  Patient encouraged to perform monthly skin exams.  UV precautions reviewed with patient.      PROCEDURE NOTE  R hip ruptured cyst and scar   EXCISION OF BENIGN LESION AND COMPLEX: [facility] " [facility], consent obtained, anesthesia and prep, the margins of the lesion were identified and an elliptical incision was made encompassing the lesion 5mm margin, 2.8cm total.  . The incisions were made through the skin and down to and including the subcutaneous tissue. The lesion was removed en bloc and submitted for perm  pathologic review. The wound edges were widely undermined until adequate tissue mobility was obtained. hemostasis was achieved. The wound edges were then closed in a layered fashion, being careful not to leave any dead space. Postoperative length was 3 cm.   EBL minimal; complications none; wound care routine. The patient was discharged in good condition and will return in one week for wound evaluation.

## 2022-03-08 NOTE — PATIENT INSTRUCTIONS
Sutured Wound Care     [facility]: [phone]    [facility]: [phone]          ? No strenuous activity for 48 hours. Resume moderate activity in 48 hours. No heavy exercising until you are seen for follow up in one week.     ? Take Tylenol as needed for discomfort.                         ? Do not drink alcoholic beverages for 48 hours.     ? Keep the pressure bandage in place for 24 hours. If the bandage becomes blood tinged or loose, reinforce it with gauze and tape.        (Refer to the reverse side of this page for management of bleeding).    ? Remove pressure bandage in 24 hours     ? Leave the flat bandage in place until your follow up appointment.    ? Keep the bandage dry. Wash around it carefully.    ? If the tape becomes soiled or starts to come off, reinforce it with additional paper tape.    ? Do not smoke for 3 weeks; smoking is detrimental to wound healing.    ? It is normal to have swelling and bruising around the surgical site. The bruising will fade in approximately 10-14 days. Elevate the area to reduce swelling.    ? Numbness, itchiness and sensitivity to temperature changes can occur after surgery and may take up to 18 months to normalize.      POSSIBLE COMPLICATIONS    BLEEDIN. Leave the bandage in place.  2. Use tightly rolled up gauze or a cloth to apply direct pressure over the bandage for 20   minutes.  3. Reapply pressure for an additional 20 minutes if necessary  4. Call the office or go to the nearest emergency room if pressure fails to stop the bleeding.  5. Use additional gauze and tape to maintain pressure once the bleeding has stopped.        PAIN:    1. Post operative pain should slowly get better, never worse.  2. A severe increase in pain may indicate a problem. Call the office if this occurs.    In case of emergency phone:Dr Colorado [phone]          Bandage change instructions    In one week:  -Remove all bandages on this day : 3/15  -Clean  area with water  -Pat dry  -Apply steri strips to the sutures  -Apply piece of paper tape over steri strips  -Apply tegaderm water resistant bandage over the top  -Keep bandages on for one more week.  Keep dry.      FOLLOW INSTRUCTIONS BELOW    WOUND CARE INSTRUCTIONS  for  ONE WEEK AFTER SURGERY            1) On 3/22 you can remove your bandage for good.  you may resume your regular skin care routine, including washing with mild soap and water, applying moisturizer, make-up and sunscreen.    2) If there are any open or bleeding areas at the incision/graft site you should begin to cover the area with a bandage daily as follows:    1) Clean and dry the area with plain tap water using a Q-tip or sterile gauze pad.  2) Apply Polysporin or Bacitracin ointment to the open area.  3) Cover the wound with a band-aid or a sterile non-stick gauze pad and micropore paper tape.         SIGNS OF INFECTION  - If you notice any of these signs of infection, call your doctor right away: expanding redness around the wound.  - Yellow or greenish-colored pus or cloudy wound drainage.    - Red streaking spreading from the wound.  - Increased swelling, tenderness, or pain around the wound.   - Fever.    Please remember that yellow and clear drainage from a wound can be normal and related to normal wound healing.  Isolated drainage from a wound without a combination of the above features does not indicate infection.       *Once the bandages are removed, the scar will be red and firm (especially in the lip/chin area). This is normal and will fade in time. It might take 6-12 months for this to happen.     *Massaging the area will help the scar soften and fade quicker. Begin to massage the area one month after the bandages have been removed. To massage apply pressure directly and firmly over the scar with the fingertips and move in a circular motion. Massage the area for a few minutes several times a day. Continue to massage the site for  several months.    *Approximately 6-8 weeks after surgery it is not uncommon to see the formation of  tender pimple-like  bump along the scar. This is normal. As the scar continues to mature and the stitches underneath the skin begin to dissolve, this might occur. Do not pick or squeeze, this will resolve on it s own. Should one break open producing a small amount of drainage, apply Polysporin or Bacitracin ointment a few times a day until the wound is completely healed.    *Numbness in the surgical area is expected. It might take 12-18 months for the feeling to return to normal. During this time sensations of itchiness, tingling and occasional sharp pains might be noted. These feelings are normal and will subside once the nerves have completely healed.         IN CASE OF EMERGENCY: Dr Colorado [phone]     If you were seen in [place] call: [phone]    If you were seen in [place] call: [phone]

## 2022-03-08 NOTE — LETTER
3/8/2022         RE: Dain Keys  [address] [facility] [address]        Dear Colleague,    Thank you for referring your patient, Dain Keys, to the [facility]. Please see a copy of my visit note below.    Dain Keys is an extremely pleasant 41 year old year old male patient here today for evaluation and managment of ruptured cyst on right hip.  Today he notes rash in axilla.   .   Patient states this has been present for a while.  Patient reports the following symptoms:  red.  Patient reports the following previous treatments none.  These treatments did not work.  Patient reports the following modifying factors none.  Associated symptoms: none.  Patient has no other skin complaints today.  Remainder of the HPI, Meds, PMH, Allergies, FH, and SH was reviewed in chart.      Past Medical History:   Diagnosis Date     No significant active problems        Past Surgical History:   Procedure Laterality Date     HC TOOTH EXTRACTION W/FORCEP          Family History   Problem Relation Age of Onset     Depression/Anxiety Father      Diabetes Maternal Grandfather      Cerebrovascular Disease Maternal Grandfather      Cardiovascular Maternal Grandfather      Diabetes Maternal Aunt      Coronary Artery Disease No family hx of      Hypertension No family hx of      Hyperlipidemia No family hx of      Breast Cancer No family hx of      Cancer - colorectal No family hx of      Ovarian Cancer No family hx of      Prostate Cancer No family hx of      Anesthesia Reaction No family hx of      Thyroid Disease No family hx of      Asthma No family hx of      Osteoporosis No family hx of      Chemical Addiction No family hx of      Known Genetic Syndrome No family hx of        Social History     Socioeconomic History     Marital status:      Spouse name: Not on file     Number of children: Not on file     Years of education: Not on file     Highest education level:  "Not on file   Occupational History     Not on file   Tobacco Use     Smoking status: Current Every Day Smoker     Packs/day: 0.25     Years: 17.00     Pack years: 4.25     Types: Cigarettes     Smokeless tobacco: Never Used   Substance and Sexual Activity     Alcohol use: Yes     Comment: rarely      Drug use: Yes     Comment: smokes marijuana occassionally     Sexual activity: Yes     Partners: Female     Comment:  one partner   Other Topics Concern     Parent/sibling w/ CABG, MI or angioplasty before 65F 55M? Not Asked   Social History Narrative     Not on file     Social Determinants of Health     Financial Resource Strain: Not on file   Food Insecurity: Not on file   Transportation Needs: Not on file   Physical Activity: Not on file   Stress: Not on file   Social Connections: Not on file   Intimate Partner Violence: Not on file   Housing Stability: Not on file       No outpatient encounter medications on file as of 3/8/2022.     No facility-administered encounter medications on file as of 3/8/2022.             O:   NAD, WDWN, Alert & Oriented, Mood & Affect wnl, Vitals stable   Here today alone   /82   Pulse 90   Ht 1.854 m (6' 1\")   BMI 24.99 kg/m     General appearance normal   Vitals stable   Alert, oriented and in no acute distress     L axilla annular red patch with pinpoint pustules  R hip comedone and scar 1.8cm       Eyes: Conjunctivae/lids:Normal     ENT: Lips, buccal mucosa, tongue: normal    MSK:Normal    Cardiovascular: peripheral edema none    Pulm: Breathing Normal    Neuro/Psych: Orientation:Alert and Orientedx3 ; Mood/Affect:normal       A/P:  1. R hip ruptured cyst and scar   Excision and recurrence and scar discussed with patient   2. Axilla candida infection  Loprox twice daily  Return to clinic 2 weeks virtual   It was a pleasure speaking to Dain Keys today.  Previous clinic notes and pertinent laboratory tests were reviewed prior to Dain Keys's visit.  Nature " and genetics of benign skin lesions dicussed with patient.  Patient encouraged to perform monthly skin exams.  UV precautions reviewed with patient.      PROCEDURE NOTE  R hip ruptured cyst and scar   EXCISION OF BENIGN LESION AND COMPLEX: After thorough discussion of [facility], consent obtained, anesthesia and prep, the margins of the lesion were identified and an elliptical incision was made encompassing the lesion 5mm margin, 2.8cm total.  . The incisions were made through the skin and down to and including the subcutaneous tissue. The lesion was removed en bloc and submitted for perm  pathologic review. The wound edges were widely undermined until adequate tissue mobility was obtained. hemostasis was achieved. The wound edges were then closed in a layered fashion, being careful not to leave any dead space. Postoperative length was 3 cm.   EBL minimal; complications none; wound care routine. The patient was discharged in good condition and will return in one week for wound evaluation.          Again, thank you for allowing me to participate in the care of your patient.        Sincerely,        Maik Colorado MD

## 2022-03-09 LAB
PATH REPORT.COMMENTS IMP SPEC: NORMAL
PATH REPORT.COMMENTS IMP SPEC: NORMAL
PATH REPORT.FINAL DX SPEC: NORMAL
PATH REPORT.GROSS SPEC: NORMAL
PATH REPORT.MICROSCOPIC SPEC OTHER STN: NORMAL
PATH REPORT.RELEVANT HX SPEC: NORMAL

## 2022-04-21 ENCOUNTER — OFFICE VISIT (OUTPATIENT)
Dept: FAMILY MEDICINE | Facility: CLINIC | Age: 42
End: 2022-04-21
Payer: COMMERCIAL

## 2022-04-21 VITALS
OXYGEN SATURATION: 100 % | BODY MASS INDEX: 25.73 KG/M2 | WEIGHT: 195.03 LBS | HEART RATE: 82 BPM | DIASTOLIC BLOOD PRESSURE: 60 MMHG | SYSTOLIC BLOOD PRESSURE: 102 MMHG | TEMPERATURE: 98.2 F

## 2022-04-21 DIAGNOSIS — B37.9 CANDIDA ALBICANS INFECTION: ICD-10-CM

## 2022-04-21 DIAGNOSIS — L05.91 PILONIDAL CYST: Primary | ICD-10-CM

## 2022-04-21 PROCEDURE — 10080 I&D PILONIDAL CYST SIMPLE: CPT | Performed by: PHYSICIAN ASSISTANT

## 2022-04-21 PROCEDURE — 99213 OFFICE O/P EST LOW 20 MIN: CPT | Mod: 25 | Performed by: PHYSICIAN ASSISTANT

## 2022-04-21 RX ORDER — CICLOPIROX OLAMINE 7.7 MG/G
CREAM TOPICAL 2 TIMES DAILY
Qty: 60 G | Refills: 6 | Status: SHIPPED | OUTPATIENT
Start: 2022-04-21 | End: 2024-05-31

## 2022-04-21 RX ORDER — DOXYCYCLINE 100 MG/1
100 CAPSULE ORAL 2 TIMES DAILY
Qty: 14 CAPSULE | Refills: 0 | Status: SHIPPED | OUTPATIENT
Start: 2022-04-21 | End: 2022-04-28

## 2022-04-21 ASSESSMENT — PAIN SCALES - GENERAL: PAINLEVEL: EXTREME PAIN (8)

## 2022-04-21 ASSESSMENT — ENCOUNTER SYMPTOMS: BACK PAIN: 1

## 2022-04-21 NOTE — PROGRESS NOTES
Assessment & Plan     Pilonidal cyst  Incision and drainage of pilonidal cyst performed - see procedure note below. With significant erythema and induration, will treat for 7 days with antibiotics. Discussed doing sitz baths, watching for increased drainage, wound care. I discussed with the patient risks and benefits of the new medication prescribed including potential side effects - including reaction to sun exposure. The patient had opportunity to ask questions and is comfortable with and interested in medications as prescribed.   - doxycycline hyclate (VIBRAMYCIN) 100 MG capsule; Take 1 capsule (100 mg) by mouth 2 times daily for 7 days  - DRAIN PILONIDAL CYST SIMPLE    Candida albicans infection  Refilled.  - ciclopirox (LOPROX) 0.77 % cream; Apply topically 2 times daily             Tobacco Cessation:   reports that he has been smoking cigarettes. He has a 4.25 pack-year smoking history. He has never used smokeless tobacco.  Tobacco Cessation Action Plan: Information offered: Patient not interested at this time        Return in about 1 week (around 4/28/2022) for worsening symptoms or failure to improve..    Dolores Bridges PA-C  [facility] CHETAN Sepulveda is a 41 year old who presents for the following health issues     Back Pain     History of Present Illness       Back Pain:  He presents for follow up of back pain. Patient's back pain is a new problem.    Original cause of back pain: not sure  First noticed back pain: in the last week  Patient feels back pain: constantlyLocation of back pain:  Left lower back  Description of back pain: sharp  Back pain spreads: left buttocks    Since patient first noticed back pain, pain is: always present, but gets better and worse  Does back pain interfere with his job:  Yes  On a scale of 1-10 (10 being the worst), patient describes pain as:  8  What makes back pain worse: bending, lying down, sitting and standing  Acupuncture: not  "tried  Acetaminophen: not tried  Activity or exercise: not tried  Chiropractor:  Helpful  Cold: not tried  Heat: helpful  Massage: not tried  Muscle relaxants: helpful  NSAIDS: helpful  Opioids: not tried  Physical Therapy: not tried  Rest: helpful  Steroid Injection: not tried  Stretching: helpful  Surgery: not tried  TENS unit: not tried  Topical pain relievers: not tried  Other healthcare providers patient is seeing for back pain: None    He eats 2-3 servings of fruits and vegetables daily.He consumes 4 sweetened beverage(s) daily.He exercises with enough effort to increase his heart rate 20 to 29 minutes per day.  He exercises with enough effort to increase his heart rate 4 days per week.   He is taking medications regularly.       Pain was significantly worse yesterday - mild improvement today. \"Feels like a cyst with fluid\". Recent travel to [place] - sitting on flight for long period of time.  Recently had scar revision on right lateral leg. No concerns with healing.          Review of Systems   Musculoskeletal: Positive for back pain.            Objective    /60 (BP Location: Right arm, Patient Position: Chair, Cuff Size: Adult Regular)   Pulse 82   Temp 98.2  F (36.8  C) (Oral)   Wt 88.5 kg (195 lb 0.5 oz)   SpO2 100%   BMI 25.73 kg/m    Body mass index is 25.73 kg/m .  Physical Exam   GENERAL: healthy, alert and no distress  SKIN: 3 mm non draining lesion noted on upper left gluteal cleft with 4 cm area of erythema and induration      Procedure Note:  Incision and drainage of infected pilonidal cyst/abscess on the tailbone was performed. Procedure, Alternatives, Risks and Questions discussed.   Patient was lying in the prone position. The area was prepared and draped in a sterile manner.  The site was anesthetized with 1% lidocaine without epinephrine.  A linear incision was made with an 11 blade along the inflamed area just to the left of the tailbone. Some bloody purulent material was " expressed from the through the incision. The abscess was thoroughly expressed until the drainage lessened. There was minimal bleeding. The incision was left open and covered with gauze and tape. The patient tolerated the procedure well without any complications. Post-procedure care was explained and the return precautions were given.

## 2022-05-15 ENCOUNTER — HEALTH MAINTENANCE LETTER (OUTPATIENT)
Age: 42
End: 2022-05-15

## 2022-05-29 ENCOUNTER — NURSE TRIAGE (OUTPATIENT)
Dept: NURSING | Facility: CLINIC | Age: 42
End: 2022-05-29
Payer: COMMERCIAL

## 2022-05-29 NOTE — TELEPHONE ENCOUNTER
"Triage Call:    Caller: Patient    Patient has been having \"it feels like a sinus infection\".  Temp 101 last night.  Is reporting his teeth even hurt.  Rating pain 3/10.      Patent is requesting to have an antibiotic called in for him.  Advised that we can triage symptoms and then determine disposition.     Urgent Care recommended disposition.  Katherine verbalized understanding about recommendations and disposition.  He verbalized he was going to take additional OTC medication at home and see how he felt tomorrow.  Encouraged to call back with any further questions.      Brigitte Schmitt RN on 5/29/2022 at 12:00 PM      COVID 19 Nurse Triage Plan/Patient Instructions    Please be aware that novel coronavirus (COVID-19) may be circulating in the community. If you develop symptoms such as fever, cough, or SOB or if you have concerns about the presence of another infection including coronavirus (COVID-19), please contact your health care provider or visit [email or web address].     Disposition/Instructions    In-Person Visit with provider recommended. Reference Visit Selection Guide.    Thank you for taking steps to prevent the spread of this virus.  o Limit your contact with others.  o Wear a simple mask to cover your cough.  o Wash your hands well and often.    Resources    [facility]: About COVID-19: [email or web address]    CDC: What to Do If You're Sick: www.cdc.gov/coronavirus/2019-ncov/about/steps-when-sick.html    CDC: Ending Home Isolation: www.cdc.gov/coronavirus/2019-ncov/hcp/disposition-in-home-patients.html     CDC: Caring for Someone: www.cdc.gov/coronavirus/2019-ncov/if-you-are-sick/care-for-someone.html     [facility]: Interim Guidance for Hospital Discharge to Home: www.health.[facility].[place]/diseases/coronavirus/hcp/hospdischarge.pdf    [facility] clinical trials (COVID-19 research studies): clinicalaffairs.[facility] [place]/umn-clinical-trials     Below are the COVID-19 hotlines at the [place] " Department of Health ([facility]). Interpreters are available.   o For health questions: Call 077-245-8312 or 1-630.287.8492 (7 a.m. to 7 p.m.)  o For questions about schools and childcare: Call 248-026-8018 or 1-237.815.4080 (7 a.m. to 7 p.m.)                   Reason for Disposition    [1] Sinus pain (not just congestion) AND [2] fever    Additional Information    Negative: Severe difficulty breathing (e.g., struggling for each breath, speaks in single words)    Negative: Sounds like a life-threatening emergency to the triager    Negative: [1] Sinus infection AND [2] taking an antibiotic AND [3] symptoms continue    Negative: [1] Difficulty breathing AND [2] not from stuffy nose (e.g., not relieved by cleaning out the nose)    Negative: [1] SEVERE headache AND [2] fever    Negative: [1] Redness or swelling on the cheek, forehead or around the eye AND [2] fever    Negative: Fever > 104 F (40 C)    Negative: Patient sounds very sick or weak to the triager    Negative: [1] SEVERE pain AND [2] not improved 2 hours after pain medicine    Negative: [1] Redness or swelling on the cheek, forehead or around the eye AND [2] no fever    Negative: [1] Fever > 101 F (38.3 C) AND [2] age > 60    Negative: [1] Fever > 100.0 F (37.8 C) AND [2] bedridden (e.g., nursing home patient, CVA, chronic illness, recovering from surgery)    Negative: [1] Fever > 100.0 F (37.8 C) AND [2] diabetes mellitus or weak immune system (e.g., HIV positive, cancer chemo, splenectomy, organ transplant, chronic steroids)    Negative: Fever present > 3 days (72 hours)    Negative: [1] Fever returns after gone for over 24 hours AND [2] symptoms worse or not improved    Protocols used: SINUS PAIN OR CONGESTION-A-

## 2022-06-14 ENCOUNTER — OFFICE VISIT (OUTPATIENT)
Dept: URGENT CARE | Facility: URGENT CARE | Age: 42
End: 2022-06-14
Payer: COMMERCIAL

## 2022-06-14 ENCOUNTER — NURSE TRIAGE (OUTPATIENT)
Dept: NURSING | Facility: CLINIC | Age: 42
End: 2022-06-14
Payer: COMMERCIAL

## 2022-06-14 VITALS
SYSTOLIC BLOOD PRESSURE: 120 MMHG | RESPIRATION RATE: 12 BRPM | DIASTOLIC BLOOD PRESSURE: 77 MMHG | WEIGHT: 191.4 LBS | HEART RATE: 71 BPM | OXYGEN SATURATION: 100 % | TEMPERATURE: 98.2 F | BODY MASS INDEX: 25.25 KG/M2

## 2022-06-14 DIAGNOSIS — L02.92 BOIL: Primary | ICD-10-CM

## 2022-06-14 PROCEDURE — 99213 OFFICE O/P EST LOW 20 MIN: CPT | Performed by: PHYSICIAN ASSISTANT

## 2022-06-14 ASSESSMENT — ENCOUNTER SYMPTOMS
VOMITING: 0
NEUROLOGICAL NEGATIVE: 1
RESPIRATORY NEGATIVE: 1
SHORTNESS OF BREATH: 0
CARDIOVASCULAR NEGATIVE: 1
COLOR CHANGE: 1
HEADACHES: 0
NAUSEA: 0
CONSTITUTIONAL NEGATIVE: 1
WHEEZING: 0
MUSCULOSKELETAL NEGATIVE: 1
DIARRHEA: 0
ABDOMINAL PAIN: 0
DYSURIA: 0
FEVER: 0
COUGH: 0
ALLERGIC/IMMUNOLOGIC NEGATIVE: 1
PALPITATIONS: 0
GASTROINTESTINAL NEGATIVE: 1
CHILLS: 0
HEMATURIA: 0
FREQUENCY: 0
MYALGIAS: 0
SORE THROAT: 0
CHEST TIGHTNESS: 0

## 2022-06-14 NOTE — TELEPHONE ENCOUNTER
Patient reporting he popped a pimple behind right ear and that area is now swollen.  He said he tried to drain it with antiseptic and a needle.  Now he reports his lymph node in that area is swollen and tender.  Denies fever, severe pain.    Disposition to be seen within 24 hours.  He would prefer an office visit and requested to be warm [facility], but there is nothing available until next week.  Patient will go to .    Ellyn Dolan RN  [facility]    COVID 19 Nurse Triage Plan/Patient Instructions    Please be aware that novel coronavirus (COVID-19) may be circulating in the community. If you develop symptoms such as fever, cough, or SOB or if you have concerns about the presence of another infection including coronavirus (COVID-19), please contact your health care provider or visit [email or web address].     Disposition/Instructions    In-Person Visit with provider recommended. Reference Visit Selection Guide.    Thank you for taking steps to prevent the spread of this virus.  o Limit your contact with others.  o Wear a simple mask to cover your cough.  o Wash your hands well and often.    Resources    [facility]: About COVID-19: [email or web address]    CDC: What to Do If You're Sick: www.cdc.gov/coronavirus/2019-ncov/about/steps-when-sick.html    CDC: Ending Home Isolation: www.cdc.gov/coronavirus/2019-ncov/hcp/disposition-in-home-patients.html     CDC: Caring for Someone: www.cdc.gov/coronavirus/2019-ncov/if-you-are-sick/care-for-someone.html     [facility]: Interim Guidance for Hospital Discharge to Home: www.health.[facility].mn.us/diseases/coronavirus/hcp/hospdischarge.pdf    [facility] clinical trials (COVID-19 research studies): clinicalaffairs.[facility]/umn-clinical-trials     Below are the COVID-19 hotlines at the [facility] of Health ([facility]). Interpreters are available.   o For health questions: Call 239-110-1019 or 1-489.102.1271 (7 a.m.  to 7 p.m.)  o For questions about schools and childcare: Call 602-397-7214 or 1-705.674.4669 (7 a.m. to 7 p.m.)      Reason for Disposition    [1] Overlying skin is red AND [2] no fever    Additional Information    Negative: [1] Widespread rash AND [2] bright red, sunburn-like AND [3] too weak to stand    Negative: Sounds like a life-threatening emergency to the triager    Negative: Severe difficulty breathing (e.g., struggling for each breath, speaks in single words)    Negative: [1] Node is in the neck AND [2] causes difficulty breathing    Negative: Known hernia is main concern (i.e., soft lump or swelling in the groin that goes away when you push on it)    Negative: Swelling of scrotum is main symptom    Negative: Swelling of face is main symptom    Negative: [1] Swollen node is in the neck AND [2] < 1 inch (2.5 cm) in size AND [3] sore throat is main symptom    Negative: [1] Node is in the neck AND [2] can't swallow fluids    Negative: Fever > 103 F (39.4 C)    Negative: [1] Lump or swelling in groin AND [2] pulsating (like heartbeat)    Negative: Patient sounds very sick or weak to the triager    Negative: [1] Single large node AND [2] size > 1 inch (2.5 cm) AND [3] fever    Negative: [1] Overlying skin is red AND [2] fever    Negative: Widespread red rash    Negative: Black (necrotic) color or blisters develop in wound    Negative: Patient sounds very sick or weak to the triager    Negative: SEVERE pain (e.g., excruciating)    Negative: Red streak from area of infection    Negative: Fever > 100.4 F (38.0 C)    Negative: [1] Single large node AND [2] size > 1 inch (2.5 cm) AND [3] no fever    Negative: Rapid increase in size of node over several hours    Protocols used: LYMPH NODES - [id number]-EX, BOIL (SKIN ABSCESS)-A-AH

## 2022-06-15 NOTE — PROGRESS NOTES
Chief Complaint:    Chief Complaint   Patient presents with     Mass     Bump behind left ear      Medical Decision Making:    Vital signs reviewed by Dain Mcqueen PA-C  /77   Pulse 71   Temp 98.2  F (36.8  C) (Tympanic)   Resp 12   Wt 86.8 kg (191 lb 6.4 oz)   SpO2 100%   BMI 25.25 kg/m      Differential Diagnosis:  Boil, abscess, cellulitis     ASSESSMENT:     1. Boil         PLAN:     Mass is not amenable to drainage at this time.  Rx for Augmentin sent in.  Patient instructed to follow up with PCP in 1 week if symptoms are not improving.  Sooner if symptoms worsen.  Worrisome symptoms discussed with instructions to go to the ED.  Patient verbalized understanding and agreed with this plan.    Labs:     No results found for any visits on 06/14/22.    Current Meds:    Current Outpatient Medications:      amoxicillin-clavulanate (AUGMENTIN) 875-125 MG tablet, Take 1 tablet by mouth 2 times daily for 7 days, Disp: 14 tablet, Rfl: 0     ciclopirox (LOPROX) 0.77 % cream, Apply topically 2 times daily, Disp: 60 g, Rfl: 6    Allergies:  Allergies   Allergen Reactions     Ceclor [Cefaclor]      Septra [Sulfamethoxazole W/Trimethoprim]        SUBJECTIVE    HPI: Dain Keys is an 41 year old male who presents for evaluation and treatment of lump behind the R ear.  Patient popped a pimple behind the ear 2 days ago.  He now has pain and feels a lump behind the ear.  He denies any fever, chills, nausea or vomiting.      ROS:      Review of Systems   Constitutional: Negative.  Negative for chills and fever.   HENT: Negative.  Negative for sore throat.    Respiratory: Negative.  Negative for cough, chest tightness, shortness of breath and wheezing.    Cardiovascular: Negative.  Negative for chest pain and palpitations.   Gastrointestinal: Negative.  Negative for abdominal pain, diarrhea, nausea and vomiting.   Genitourinary: Negative for dysuria, frequency, hematuria and urgency.   Musculoskeletal:  Negative.  Negative for myalgias.   Skin: Positive for color change. Negative for rash.   Allergic/Immunologic: Negative.  Negative for immunocompromised state.   Neurological: Negative.  Negative for headaches.        Family History   Family History   Problem Relation Age of Onset     Depression/Anxiety Father      Diabetes Maternal Grandfather      Cerebrovascular Disease Maternal Grandfather      Cardiovascular Maternal Grandfather      Diabetes Maternal Aunt      Coronary Artery Disease No family hx of      Hypertension No family hx of      Hyperlipidemia No family hx of      Breast Cancer No family hx of      Cancer - colorectal No family hx of      Ovarian Cancer No family hx of      Prostate Cancer No family hx of      Anesthesia Reaction No family hx of      Thyroid Disease No family hx of      Asthma No family hx of      Osteoporosis No family hx of      Chemical Addiction No family hx of      Known Genetic Syndrome No family hx of        Social History  Social History     Socioeconomic History     Marital status:      Spouse name: Not on file     Number of children: Not on file     Years of education: Not on file     Highest education level: Not on file   Occupational History     Not on file   Tobacco Use     Smoking status: Current Every Day Smoker     Packs/day: 0.25     Years: 17.00     Pack years: 4.25     Types: Cigarettes     Smokeless tobacco: Never Used   Substance and Sexual Activity     Alcohol use: Yes     Comment: rarely      Drug use: Yes     Comment: smokes marijuana occassionally     Sexual activity: Yes     Partners: Female     Comment:  one partner   Other Topics Concern     Parent/sibling w/ CABG, MI or angioplasty before 65F 55M? Not Asked   Social History Narrative     Not on file     Social Determinants of Health     Financial Resource Strain: Not on file   Food Insecurity: Not on file   Transportation Needs: Not on file   Physical Activity: Not on file   Stress: Not on  file   Social Connections: Not on file   Intimate Partner Violence: Not on file   Housing Stability: Not on file        Surgical History:  Past Surgical History:   Procedure Laterality Date     HC TOOTH EXTRACTION W/FORCEP          Problem List:  Patient Active Problem List   Diagnosis     CARDIOVASCULAR SCREENING; LDL GOAL LESS THAN 160     Other seborrheic keratosis right posterior thorax      Anxiety     Raynaud's disease without gangrene     Primary focal hyperhidrosis, palms     Tobacco use disorder           OBJECTIVE:     Vital signs noted and reviewed by Dain Mcqueen PA-C  /77   Pulse 71   Temp 98.2  F (36.8  C) (Tympanic)   Resp 12   Wt 86.8 kg (191 lb 6.4 oz)   SpO2 100%   BMI 25.25 kg/m       PEFR:    Physical Exam  Vitals and nursing note reviewed.   Constitutional:       General: He is not in acute distress.     Appearance: He is well-developed. He is not ill-appearing, toxic-appearing or diaphoretic.   HENT:      Head: Normocephalic and atraumatic.        Comments: Roughly 2 cm in diameter mass behind the R ear.       Right Ear: Hearing, tympanic membrane, ear canal and external ear normal. Tympanic membrane is not perforated, erythematous, retracted or bulging.      Left Ear: Hearing, tympanic membrane, ear canal and external ear normal. Tympanic membrane is not perforated, erythematous, retracted or bulging.      Nose: Nose normal. No mucosal edema, congestion or rhinorrhea.      Mouth/Throat:      Pharynx: No oropharyngeal exudate or posterior oropharyngeal erythema.      Tonsils: No tonsillar exudate or tonsillar abscesses. 0 on the right. 0 on the left.   Eyes:      Pupils: Pupils are equal, round, and reactive to light.   Cardiovascular:      Rate and Rhythm: Normal rate and regular rhythm.      Heart sounds: Normal heart sounds, S1 normal and S2 normal. Heart sounds not distant. No murmur heard.    No friction rub. No gallop.   Pulmonary:      Effort: Pulmonary effort is normal.  No respiratory distress.      Breath sounds: Normal breath sounds. No decreased breath sounds, wheezing, rhonchi or rales.   Abdominal:      General: Bowel sounds are normal. There is no distension.      Palpations: Abdomen is soft.      Tenderness: There is no abdominal tenderness.   Musculoskeletal:      Cervical back: Normal range of motion and neck supple.   Lymphadenopathy:      Cervical: No cervical adenopathy.   Skin:     General: Skin is warm and dry.      Findings: No rash.   Neurological:      Mental Status: He is alert.      Cranial Nerves: No cranial nerve deficit.   Psychiatric:         Attention and Perception: He is attentive.         Speech: Speech normal.         Behavior: Behavior normal. Behavior is cooperative.         Thought Content: Thought content normal.         Judgment: Judgment normal.             Dain Mcqueen PA-C  6/14/2022, 7:10 PM

## 2022-09-11 ENCOUNTER — HEALTH MAINTENANCE LETTER (OUTPATIENT)
Age: 42
End: 2022-09-11

## 2023-06-03 ENCOUNTER — HEALTH MAINTENANCE LETTER (OUTPATIENT)
Age: 43
End: 2023-06-03

## 2023-07-04 NOTE — PROGRESS NOTES
"  Assessment & Plan     Scar condition and fibrosis of skin  No sign of infection noted. Patient concerned about scar and through shared decision making it was determined a follow up with dermatology will be appropriate for further evaluation and possible scar revision or excision.    - Adult Dermatology Referral; Future    Enlarged thyroid  Patient denies any recent illness. Thyroid landmark on left side possibly enlarged on exam.  Will order diagnostic ultra-sound and lab workup for evaluation.  - US Thyroid; Future  - TSH with free T4 reflex    Screening for hyperlipidemia  Cardiovascular screening - Asymptomatic  - Lipid panel reflex to direct LDL Non-fasting    High priority for 2019-nCoV vaccine  Patient received the Moderna COVID-19 Vaccine in the clinic today  - COVID-19,PF,MODERNA (18+ Yrs BOOSTER .25mL)     Return in about 4 weeks (around 1/17/2022), or if symptoms worsen or fail to improve.    DARREN BrysonN, RN   DNP/FNP student  [facility]    I very much appreciated the opportunity to see and assess this patient in the clinic with NP student.  I agree with the above note which summarizes my findings and current recommendations. I have reviewed all diagnostics noted and performed physical exam. Changes were made in the body of the note to achieve one comprehensive document.    Samantha Loving, TORIBIO CNP  [facility] CHETAN Sepulveda is a 41 year old who presents for the following health issues     HPI   Per RN Triage TE 12/8/2021:  Patient seen 8- for lump on right lateral buttock/hip and had it opened, drained.  \"Carved it out but found nothing.\"  Patient reporting it hasn't fully healed. Denies fever, spreading redness, pain, \"but the hole is still there.\"       Wound check   Onset: 8/22/2020  Description: There is a divet at the right buttocks hip area from a surgical removal that is concerning to patient.   Progression of Symptoms:  Denies " "pain, drainage or redness.    Patient also reports concerns with lymph nodes or swelling in his neck.    Review of Systems   Constitutional, HEENT, cardiovascular, pulmonary, gi and gu systems are negative, except as otherwise noted.      Objective    /75   Pulse 82   Temp 98.5  F (36.9  C) (Oral)   Resp 18   Ht 1.854 m (6' 1\")   Wt 85.9 kg (189 lb 6.4 oz)   SpO2 100%   BMI 24.99 kg/m    Body mass index is 24.99 kg/m .  Physical Exam   GENERAL: healthy, alert and no distress  NECK: no adenopathy, no asymmetry, masses, or scars and thyroid normal to palpation  RESP: lungs clear to auscultation - no rales, rhonchi or wheezes  CV: regular rate and rhythm, normal S1 S2, no S3 or S4, no murmur, click or rub, no peripheral edema and peripheral pulses strong  ABDOMEN: soft, nontender, no hepatosplenomegaly, no masses and bowel sounds normal  MS: no gross musculoskeletal defects noted, no edema  SKIN: no suspicious lesions or rashes, scar - excision scar on right hip          No results found for this or any previous visit (from the past 24 hour(s)).  Lab work still in process, will review in Epic as and when they become available          " 1 Principal Discharge DX:	Neck pain

## 2023-11-08 ENCOUNTER — TELEPHONE (OUTPATIENT)
Dept: FAMILY MEDICINE | Facility: CLINIC | Age: 43
End: 2023-11-08
Payer: COMMERCIAL
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
## 2023-11-15 ENCOUNTER — OFFICE VISIT (OUTPATIENT)
Dept: FAMILY MEDICINE | Facility: CLINIC | Age: 43
End: 2023-11-15
Payer: COMMERCIAL

## 2023-11-15 ENCOUNTER — ANCILLARY PROCEDURE (OUTPATIENT)
Dept: GENERAL RADIOLOGY | Facility: CLINIC | Age: 43
End: 2023-11-15
Attending: FAMILY MEDICINE
Payer: COMMERCIAL

## 2023-11-15 VITALS
DIASTOLIC BLOOD PRESSURE: 82 MMHG | BODY MASS INDEX: 25.65 KG/M2 | WEIGHT: 194.4 LBS | HEART RATE: 77 BPM | OXYGEN SATURATION: 100 % | SYSTOLIC BLOOD PRESSURE: 123 MMHG | TEMPERATURE: 98 F

## 2023-11-15 DIAGNOSIS — M54.9 UPPER BACK PAIN: ICD-10-CM

## 2023-11-15 DIAGNOSIS — S20.219D CONTUSION OF CHEST WALL, UNSPECIFIED LATERALITY, SUBSEQUENT ENCOUNTER: ICD-10-CM

## 2023-11-15 DIAGNOSIS — S06.0X0D CONCUSSION WITHOUT LOSS OF CONSCIOUSNESS, SUBSEQUENT ENCOUNTER: ICD-10-CM

## 2023-11-15 DIAGNOSIS — M54.50 ACUTE BILATERAL LOW BACK PAIN WITHOUT SCIATICA: ICD-10-CM
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
## 2023-11-15 PROCEDURE — 99214 OFFICE O/P EST MOD 30 MIN: CPT | Performed by: FAMILY MEDICINE

## 2023-11-15 PROCEDURE — 71046 X-RAY EXAM CHEST 2 VIEWS: CPT | Mod: TC | Performed by: RADIOLOGY

## 2023-11-15 RX ORDER — NAPROXEN 500 MG/1
500 TABLET ORAL 2 TIMES DAILY WITH MEALS
Qty: 30 TABLET | Refills: 0 | Status: SHIPPED | OUTPATIENT
Start: 2023-11-15 | End: 2024-05-31

## 2023-11-15 RX ORDER — METAXALONE 800 MG/1
800 TABLET ORAL 3 TIMES DAILY
Qty: 30 TABLET | Refills: 0 | Status: SHIPPED | OUTPATIENT
Start: 2023-11-15 | End: 2024-05-31

## 2023-11-15 ASSESSMENT — PAIN SCALES - GENERAL: PAINLEVEL: NO PAIN (0)
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
## 2023-12-01 ENCOUNTER — THERAPY VISIT (OUTPATIENT)
Dept: PHYSICAL THERAPY | Facility: CLINIC | Age: 43
End: 2023-12-01
Attending: FAMILY MEDICINE
Payer: COMMERCIAL

## 2023-12-01 DIAGNOSIS — S06.0X0D CONCUSSION WITHOUT LOSS OF CONSCIOUSNESS, SUBSEQUENT ENCOUNTER: ICD-10-CM

## 2023-12-01 DIAGNOSIS — M54.50 ACUTE BILATERAL LOW BACK PAIN WITHOUT SCIATICA: ICD-10-CM

## 2023-12-01 DIAGNOSIS — S20.219D CONTUSION OF CHEST WALL, UNSPECIFIED LATERALITY, SUBSEQUENT ENCOUNTER: ICD-10-CM

## 2023-12-01 DIAGNOSIS — M54.42 ACUTE BILATERAL LOW BACK PAIN WITH LEFT-SIDED SCIATICA: Chronic | ICD-10-CM

## 2023-12-01 DIAGNOSIS — M54.2 CERVICALGIA: Primary | ICD-10-CM

## 2023-12-01 DIAGNOSIS — M54.9 UPPER BACK PAIN: ICD-10-CM
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
## 2023-12-01 PROCEDURE — 97110 THERAPEUTIC EXERCISES: CPT | Mod: GP | Performed by: PHYSICAL THERAPIST

## 2023-12-01 PROCEDURE — 97161 PT EVAL LOW COMPLEX 20 MIN: CPT | Mod: GP | Performed by: PHYSICAL THERAPIST
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
## 2023-12-22 ENCOUNTER — THERAPY VISIT (OUTPATIENT)
Dept: PHYSICAL THERAPY | Facility: CLINIC | Age: 43
End: 2023-12-22
Payer: COMMERCIAL

## 2023-12-22 DIAGNOSIS — M54.42 ACUTE BILATERAL LOW BACK PAIN WITH LEFT-SIDED SCIATICA: Chronic | ICD-10-CM

## 2023-12-22 DIAGNOSIS — M54.2 CERVICALGIA: Primary | ICD-10-CM

## 2023-12-22 PROCEDURE — 97110 THERAPEUTIC EXERCISES: CPT | Mod: GP | Performed by: PHYSICAL THERAPIST

## 2023-12-22 PROCEDURE — 97140 MANUAL THERAPY 1/> REGIONS: CPT | Mod: GP | Performed by: PHYSICAL THERAPIST

## 2023-12-29 ENCOUNTER — THERAPY VISIT (OUTPATIENT)
Dept: PHYSICAL THERAPY | Facility: CLINIC | Age: 43
End: 2023-12-29
Payer: COMMERCIAL

## 2023-12-29 DIAGNOSIS — M54.2 CERVICALGIA: Primary | ICD-10-CM

## 2023-12-29 DIAGNOSIS — M54.42 ACUTE BILATERAL LOW BACK PAIN WITH LEFT-SIDED SCIATICA: Chronic | ICD-10-CM

## 2023-12-29 PROCEDURE — 97110 THERAPEUTIC EXERCISES: CPT | Mod: GP | Performed by: PHYSICAL THERAPIST

## 2023-12-29 PROCEDURE — 97140 MANUAL THERAPY 1/> REGIONS: CPT | Mod: GP | Performed by: PHYSICAL THERAPIST

## 2024-01-05 ENCOUNTER — THERAPY VISIT (OUTPATIENT)
Dept: PHYSICAL THERAPY | Facility: CLINIC | Age: 44
End: 2024-01-05
Payer: COMMERCIAL

## 2024-01-05 DIAGNOSIS — M54.2 CERVICALGIA: Primary | ICD-10-CM

## 2024-01-05 DIAGNOSIS — M54.42 ACUTE BILATERAL LOW BACK PAIN WITH LEFT-SIDED SCIATICA: Chronic | ICD-10-CM

## 2024-01-05 PROCEDURE — 97140 MANUAL THERAPY 1/> REGIONS: CPT | Mod: GP | Performed by: PHYSICAL THERAPIST

## 2024-01-05 PROCEDURE — 97110 THERAPEUTIC EXERCISES: CPT | Mod: GP | Performed by: PHYSICAL THERAPIST

## 2024-01-12 ENCOUNTER — THERAPY VISIT (OUTPATIENT)
Dept: PHYSICAL THERAPY | Facility: CLINIC | Age: 44
End: 2024-01-12
Payer: COMMERCIAL

## 2024-01-12 DIAGNOSIS — M54.42 ACUTE BILATERAL LOW BACK PAIN WITH LEFT-SIDED SCIATICA: Chronic | ICD-10-CM

## 2024-01-12 DIAGNOSIS — M54.2 CERVICALGIA: Primary | ICD-10-CM

## 2024-01-12 PROCEDURE — 97110 THERAPEUTIC EXERCISES: CPT | Mod: GP | Performed by: PHYSICAL THERAPIST

## 2024-01-12 PROCEDURE — 97140 MANUAL THERAPY 1/> REGIONS: CPT | Mod: GP | Performed by: PHYSICAL THERAPIST

## 2024-01-18 ENCOUNTER — OFFICE VISIT (OUTPATIENT)
Dept: FAMILY MEDICINE | Facility: CLINIC | Age: 44
End: 2024-01-18
Payer: COMMERCIAL

## 2024-01-18 VITALS
HEART RATE: 96 BPM | SYSTOLIC BLOOD PRESSURE: 120 MMHG | OXYGEN SATURATION: 99 % | HEIGHT: 73 IN | WEIGHT: 194 LBS | TEMPERATURE: 98 F | DIASTOLIC BLOOD PRESSURE: 83 MMHG | RESPIRATION RATE: 16 BRPM | BODY MASS INDEX: 25.71 KG/M2

## 2024-01-18 DIAGNOSIS — M54.2 CERVICAL SPINE PAIN: ICD-10-CM

## 2024-01-18 DIAGNOSIS — M54.9 UPPER BACK PAIN: ICD-10-CM
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
## 2024-01-18 PROCEDURE — 99213 OFFICE O/P EST LOW 20 MIN: CPT | Performed by: FAMILY MEDICINE
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
## 2024-01-19 ENCOUNTER — THERAPY VISIT (OUTPATIENT)
Dept: PHYSICAL THERAPY | Facility: CLINIC | Age: 44
End: 2024-01-19
Payer: COMMERCIAL

## 2024-01-19 DIAGNOSIS — M54.42 ACUTE BILATERAL LOW BACK PAIN WITH LEFT-SIDED SCIATICA: Chronic | ICD-10-CM

## 2024-01-19 DIAGNOSIS — M54.2 CERVICALGIA: Primary | ICD-10-CM

## 2024-01-19 PROCEDURE — 97110 THERAPEUTIC EXERCISES: CPT | Mod: GP | Performed by: PHYSICAL THERAPIST

## 2024-01-19 PROCEDURE — 97140 MANUAL THERAPY 1/> REGIONS: CPT | Mod: GP | Performed by: PHYSICAL THERAPIST

## 2024-02-02 ENCOUNTER — THERAPY VISIT (OUTPATIENT)
Dept: PHYSICAL THERAPY | Facility: CLINIC | Age: 44
End: 2024-02-02
Payer: COMMERCIAL

## 2024-02-02 DIAGNOSIS — M54.2 CERVICALGIA: Primary | ICD-10-CM

## 2024-02-02 DIAGNOSIS — M54.42 ACUTE BILATERAL LOW BACK PAIN WITH LEFT-SIDED SCIATICA: Chronic | ICD-10-CM

## 2024-02-02 PROCEDURE — 97110 THERAPEUTIC EXERCISES: CPT | Mod: GP | Performed by: PHYSICAL THERAPIST
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
## 2024-02-09 ENCOUNTER — THERAPY VISIT (OUTPATIENT)
Dept: PHYSICAL THERAPY | Facility: CLINIC | Age: 44
End: 2024-02-09
Payer: COMMERCIAL

## 2024-02-09 DIAGNOSIS — M54.2 CERVICALGIA: Primary | ICD-10-CM

## 2024-02-09 DIAGNOSIS — M54.42 ACUTE BILATERAL LOW BACK PAIN WITH LEFT-SIDED SCIATICA: Chronic | ICD-10-CM

## 2024-02-09 PROCEDURE — 97140 MANUAL THERAPY 1/> REGIONS: CPT | Mod: GP | Performed by: PHYSICAL THERAPIST

## 2024-02-09 PROCEDURE — 97110 THERAPEUTIC EXERCISES: CPT | Mod: GP | Performed by: PHYSICAL THERAPIST
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
## 2024-02-18 ENCOUNTER — HEALTH MAINTENANCE LETTER (OUTPATIENT)
Age: 44
End: 2024-02-18

## 2024-02-22 ENCOUNTER — ANCILLARY PROCEDURE (OUTPATIENT)
Dept: MRI IMAGING | Facility: CLINIC | Age: 44
End: 2024-02-22
Attending: FAMILY MEDICINE
Payer: COMMERCIAL

## 2024-02-22 DIAGNOSIS — M54.9 UPPER BACK PAIN: ICD-10-CM
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
## 2024-02-22 DIAGNOSIS — M54.2 CERVICAL SPINE PAIN: ICD-10-CM

## 2024-02-22 PROCEDURE — 72141 MRI NECK SPINE W/O DYE: CPT | Performed by: RADIOLOGY

## 2024-05-01 ENCOUNTER — THERAPY VISIT (OUTPATIENT)
Dept: PHYSICAL THERAPY | Facility: CLINIC | Age: 44
End: 2024-05-01
Payer: COMMERCIAL

## 2024-05-01 DIAGNOSIS — M25.561 CHRONIC PAIN OF RIGHT KNEE: Chronic | ICD-10-CM

## 2024-05-01 DIAGNOSIS — M54.42 ACUTE BILATERAL LOW BACK PAIN WITH LEFT-SIDED SCIATICA: Chronic | ICD-10-CM

## 2024-05-01 DIAGNOSIS — M54.2 CERVICALGIA: Primary | ICD-10-CM

## 2024-05-01 DIAGNOSIS — G89.29 CHRONIC PAIN OF RIGHT KNEE: Chronic | ICD-10-CM

## 2024-05-01 PROCEDURE — 97110 THERAPEUTIC EXERCISES: CPT | Mod: GP | Performed by: PHYSICAL THERAPIST
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
## 2024-05-31 ENCOUNTER — OFFICE VISIT (OUTPATIENT)
Dept: FAMILY MEDICINE | Facility: CLINIC | Age: 44
End: 2024-05-31
Payer: COMMERCIAL

## 2024-05-31 VITALS
OXYGEN SATURATION: 99 % | HEART RATE: 82 BPM | DIASTOLIC BLOOD PRESSURE: 77 MMHG | BODY MASS INDEX: 27.49 KG/M2 | RESPIRATION RATE: 11 BRPM | HEIGHT: 72 IN | SYSTOLIC BLOOD PRESSURE: 109 MMHG | WEIGHT: 202.96 LBS | TEMPERATURE: 98.7 F

## 2024-05-31 DIAGNOSIS — Z13.220 SCREENING FOR HYPERLIPIDEMIA: ICD-10-CM

## 2024-05-31 DIAGNOSIS — Z13.21 ENCOUNTER FOR VITAMIN DEFICIENCY SCREENING: ICD-10-CM

## 2024-05-31 DIAGNOSIS — L81.9 PIGMENTED SKIN LESION: ICD-10-CM

## 2024-05-31 DIAGNOSIS — Z00.00 ROUTINE GENERAL MEDICAL EXAMINATION AT A HEALTH CARE FACILITY: Primary | ICD-10-CM

## 2024-05-31 DIAGNOSIS — Z13.1 SCREENING FOR DIABETES MELLITUS: ICD-10-CM

## 2024-05-31 DIAGNOSIS — Z13.0 SCREENING FOR DEFICIENCY ANEMIA: ICD-10-CM

## 2024-05-31 PROCEDURE — 99396 PREV VISIT EST AGE 40-64: CPT | Performed by: PHYSICIAN ASSISTANT

## 2024-05-31 RX ORDER — CYCLOBENZAPRINE HCL 5 MG
5 TABLET ORAL PRN
COMMUNITY
End: 2024-06-05

## 2024-05-31 RX ORDER — PROPRANOLOL HCL 60 MG
1 CAPSULE, EXTENDED RELEASE 24HR ORAL DAILY
COMMUNITY
Start: 2024-04-25

## 2024-05-31 SDOH — HEALTH STABILITY: PHYSICAL HEALTH: ON AVERAGE, HOW MANY DAYS PER WEEK DO YOU ENGAGE IN MODERATE TO STRENUOUS EXERCISE (LIKE A BRISK WALK)?: 3 DAYS

## 2024-05-31 ASSESSMENT — SOCIAL DETERMINANTS OF HEALTH (SDOH): HOW OFTEN DO YOU GET TOGETHER WITH FRIENDS OR RELATIVES?: TWICE A WEEK

## 2024-05-31 ASSESSMENT — PAIN SCALES - GENERAL: PAINLEVEL: MODERATE PAIN (5)
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
## 2024-05-31 NOTE — PATIENT INSTRUCTIONS
"Schedule 40 minute visit for skin lesion removal- same day appointment is ok.   Please schedule a fasting lab appointment (nothing to eat or drink 9 hours prior except water and/or your medications) at your earliest convenience.    Patient Education      Preventive Care Advice   This is general advice we often give to help people stay healthy. Your care team may have specific advice just for you. Please talk to your care team about your own preventive care needs.  Lifestyle  Exercise at least 150 minutes each week (30 minutes a day, 5 days a week).  Do muscle strengthening activities 2 days a week. These help control your weight and prevent disease.  No smoking.  Wear sunscreen to prevent skin cancer.  Have your home tested for radon every 2 to 5 years. Radon is a colorless, odorless gas that can harm your lungs. To learn more, go to www.health.[facility] and search for \"Radon in Homes.\"  Keep guns unloaded and locked up in a safe place like a safe or gun vault, or, use a gun lock and hide the keys. Always lock away bullets separately. To learn more, visit [employer].gov and search for \"safe gun storage.\"  Nutrition  Eat 5 or more servings of fruits and vegetables each day.  Try wheat bread, brown rice and whole grain pasta (instead of white bread, rice, and pasta).  Get enough calcium and vitamin D. Check the label on foods and aim for 100% of the RDA (recommended daily allowance).  Regular exams  Have a dental exam and cleaning every 6 months.  See your health care team every year to talk about:  Any changes in your health.  Any medicines your care team has prescribed.  Preventive care, family planning, and ways to prevent chronic diseases.  Shots (vaccines)   HPV shots (up to age 26), if you've never had them before.  Hepatitis B shots (up to age 59), if you've never had them before.  COVID-19 shot: Get this shot when it's due.  Flu shot: Get a flu shot every year.  Tetanus shot: Get a tetanus shot every 10 " years.  Pneumococcal, hepatitis A, and RSV shots: Ask your care team if you need these based on your risk.  Shingles shot (for age 50 and up).  General health tests  Diabetes screening:  Starting at age 35, Get screened for diabetes at least every 3 years.  If you are younger than age 35, ask your care team if you should be screened for diabetes.  Cholesterol test: At age 39, start having a cholesterol test every 5 years, or more often if advised.  Bone density scan (DEXA): At age 50, ask your care team if you should have this scan for osteoporosis (brittle bones).  Hepatitis C: Get tested at least once in your life.  Abdominal aortic aneurysm screening: Talk to your doctor about having this screening if you:  Have ever smoked; and  Are biologically male; and  Are between the ages of 65 and 75.  STIs (sexually transmitted infections)  Before age 24: Ask your care team if you should be screened for STIs.  After age 24: Get screened for STIs if you're at risk. You are at risk for STIs (including HIV) if:  You are sexually active with more than one person.  You don't use condoms every time.  You or a partner was diagnosed with a sexually transmitted infection.  If you are at risk for HIV, ask about PrEP medicine to prevent HIV.  Get tested for HIV at least once in your life, whether you are at risk for HIV or not.  Cancer screening tests  Cervical cancer screening: If you have a cervix, begin getting regular cervical cancer screening tests at age 21. Most people who have regular screenings with normal results can stop after age 65. Talk about this with your provider.  Breast cancer scan (mammogram): If you've ever had breasts, begin having regular mammograms starting at age 40. This is a scan to check for breast cancer.  Colon cancer screening: It is important to start screening for colon cancer at age 45.  Have a colonoscopy test every 10 years (or more often if you're at risk) Or, ask your provider about stool tests  like a FIT test every year or Cologuard test every 3 years.  To learn more about your testing options, visit: www.[employer]/572165.pdf.  For help making a decision, visit: manuel/yj95398.  Prostate cancer screening test: If you have a prostate and are age 55 to 69, ask your provider if you would benefit from a yearly prostate cancer screening test.  Lung cancer screening: If you are a current or former smoker age 50 to 80, ask your care team if ongoing lung cancer screenings are right for you.  For informational purposes only. Not to replace the advice of your health care provider. Copyright   2023 [facility]. All rights reserved. Clinically reviewed by the [facility] Transitions Program. [employer] [id number] - REV 04/24.

## 2024-06-05 ENCOUNTER — OFFICE VISIT (OUTPATIENT)
Dept: FAMILY MEDICINE | Facility: CLINIC | Age: 44
End: 2024-06-05
Payer: COMMERCIAL

## 2024-06-05 ENCOUNTER — LAB (OUTPATIENT)
Dept: LAB | Facility: CLINIC | Age: 44
End: 2024-06-05
Payer: COMMERCIAL

## 2024-06-05 VITALS
RESPIRATION RATE: 16 BRPM | HEART RATE: 91 BPM | HEIGHT: 72 IN | WEIGHT: 199.44 LBS | SYSTOLIC BLOOD PRESSURE: 118 MMHG | TEMPERATURE: 98.4 F | OXYGEN SATURATION: 96 % | DIASTOLIC BLOOD PRESSURE: 82 MMHG | BODY MASS INDEX: 27.01 KG/M2

## 2024-06-05 DIAGNOSIS — L82.1 SEBORRHEIC KERATOSIS: Primary | ICD-10-CM

## 2024-06-05 DIAGNOSIS — Z13.220 SCREENING FOR HYPERLIPIDEMIA: ICD-10-CM

## 2024-06-05 DIAGNOSIS — Z13.21 ENCOUNTER FOR VITAMIN DEFICIENCY SCREENING: ICD-10-CM

## 2024-06-05 DIAGNOSIS — Z13.0 SCREENING FOR DEFICIENCY ANEMIA: ICD-10-CM

## 2024-06-05 DIAGNOSIS — Z13.1 SCREENING FOR DIABETES MELLITUS: ICD-10-CM

## 2024-06-05 DIAGNOSIS — R73.01 ELEVATED FASTING GLUCOSE: ICD-10-CM

## 2024-06-05 LAB
BASOPHILS # BLD AUTO: 0.1 10E3/UL (ref 0–0.2)
BASOPHILS NFR BLD AUTO: 1 %
EOSINOPHIL # BLD AUTO: 0.2 10E3/UL (ref 0–0.7)
EOSINOPHIL NFR BLD AUTO: 2 %
ERYTHROCYTE [DISTWIDTH] IN BLOOD BY AUTOMATED COUNT: 13.7 % (ref 10–15)
HCT VFR BLD AUTO: 46.3 % (ref 40–53)
HGB BLD-MCNC: 15.2 G/DL (ref 13.3–17.7)
IMM GRANULOCYTES # BLD: 0 10E3/UL
IMM GRANULOCYTES NFR BLD: 0 %
LYMPHOCYTES # BLD AUTO: 2.2 10E3/UL (ref 0.8–5.3)
LYMPHOCYTES NFR BLD AUTO: 26 %
MCH RBC QN AUTO: 30.8 PG (ref 26.5–33)
MCHC RBC AUTO-ENTMCNC: 32.8 G/DL (ref 31.5–36.5)
MCV RBC AUTO: 94 FL (ref 78–100)
MONOCYTES # BLD AUTO: 0.9 10E3/UL (ref 0–1.3)
MONOCYTES NFR BLD AUTO: 10 %
NEUTROPHILS # BLD AUTO: 5.2 10E3/UL (ref 1.6–8.3)
NEUTROPHILS NFR BLD AUTO: 61 %
PLATELET # BLD AUTO: 359 10E3/UL (ref 150–450)
RBC # BLD AUTO: 4.93 10E6/UL (ref 4.4–5.9)
WBC # BLD AUTO: 8.5 10E3/UL (ref 4–11)

## 2024-06-05 PROCEDURE — 83036 HEMOGLOBIN GLYCOSYLATED A1C: CPT

## 2024-06-05 PROCEDURE — 82306 VITAMIN D 25 HYDROXY: CPT

## 2024-06-05 PROCEDURE — 85025 COMPLETE CBC W/AUTO DIFF WBC: CPT

## 2024-06-05 PROCEDURE — 36415 COLL VENOUS BLD VENIPUNCTURE: CPT

## 2024-06-05 PROCEDURE — 17110 DESTRUCTION B9 LES UP TO 14: CPT | Performed by: FAMILY MEDICINE

## 2024-06-05 PROCEDURE — 80061 LIPID PANEL: CPT

## 2024-06-05 PROCEDURE — 80053 COMPREHEN METABOLIC PANEL: CPT

## 2024-06-05 ASSESSMENT — PAIN SCALES - GENERAL: PAINLEVEL: SEVERE PAIN (6)

## 2024-06-05 NOTE — PROGRESS NOTES
Assessment & Plan     Seborrheic keratosis  Had seen Elisha recently for checkup and had noted some growths on his back that are changing a bit.  He was concerned that it may represent cancer.  Upon exam he has 3 separate waxy scaly lesions consistent with seborrheic keratoses.  There is a larger 1 measuring almost 2 cm long on his right mid back and that is the one that tends to itch.  A couple on the left upper back are a bit itchy as well.  Cryotherapy to all 3 lesions.  Discussed expected course and aftercare.  Discussed benign nature of these lesions.  - DESTRUCT BENIGN LESION, UP TO 14        See Patient Instructions    Subjective   Derick is a 43 year old, presenting for the following health issues:  Lesion Removal        6/5/2024     2:11 PM   Additional Questions   Roomed by Diane Lynne Schoenherr RN     History of Present Illness       Reason for visit:  Removal of concerning back mole  Symptom onset:  More than a month  Symptoms include:  Itchy sometimes painful  Symptom intensity:  Mild  Symptom progression:  Staying the same  Had these symptoms before:  Yes  Has tried/received treatment for these symptoms:  No  What makes it worse:  Shower  What makes it better:  No    He eats 2-3 servings of fruits and vegetables daily.He consumes 3 sweetened beverage(s) daily.He exercises with enough effort to increase his heart rate 20 to 29 minutes per day.  He exercises with enough effort to increase his heart rate 4 days per week.   He is taking medications regularly.           Here today for spots on his back as above.      Review of Systems  Constitutional, HEENT, cardiovascular, pulmonary, gi and gu systems are negative, except as otherwise noted.      Objective    /82 (BP Location: Right arm, Patient Position: Sitting, Cuff Size: Adult Large)   Pulse 91   Temp 98.4  F (36.9  C) (Oral)   Resp 16   Ht 1.829 m (6')   Wt 90.5 kg (199 lb 7 oz)   SpO2 96%   BMI 27.05 kg/m    Body mass index is 27.05  kg/m .  Physical Exam   Alert, pleasant, upbeat, and in no apparent discomfort.  3 separate waxy scaly lesions on mid and upper back  Past labs reviewed with the patient.             Signed Electronically by: Abena Acosta MD

## 2024-06-06 ENCOUNTER — THERAPY VISIT (OUTPATIENT)
Dept: PHYSICAL THERAPY | Facility: CLINIC | Age: 44
End: 2024-06-06
Payer: COMMERCIAL

## 2024-06-06 DIAGNOSIS — R73.01 ELEVATED FASTING GLUCOSE: Primary | ICD-10-CM

## 2024-06-06 DIAGNOSIS — E83.52 HYPERCALCEMIA: ICD-10-CM

## 2024-06-06 DIAGNOSIS — M54.42 ACUTE BILATERAL LOW BACK PAIN WITH LEFT-SIDED SCIATICA: Chronic | ICD-10-CM

## 2024-06-06 DIAGNOSIS — M54.2 CERVICALGIA: Primary | ICD-10-CM

## 2024-06-06 LAB
ALBUMIN SERPL BCG-MCNC: 4.8 G/DL (ref 3.5–5.2)
ALP SERPL-CCNC: 77 U/L (ref 40–150)
ALT SERPL W P-5'-P-CCNC: 27 U/L (ref 0–70)
ANION GAP SERPL CALCULATED.3IONS-SCNC: 12 MMOL/L (ref 7–15)
AST SERPL W P-5'-P-CCNC: 19 U/L (ref 0–45)
BILIRUB SERPL-MCNC: 0.6 MG/DL
BUN SERPL-MCNC: 11.5 MG/DL (ref 6–20)
CALCIUM SERPL-MCNC: 10.1 MG/DL (ref 8.6–10)
CHLORIDE SERPL-SCNC: 100 MMOL/L (ref 98–107)
CHOLEST SERPL-MCNC: 153 MG/DL
CREAT SERPL-MCNC: 0.85 MG/DL (ref 0.67–1.17)
DEPRECATED HCO3 PLAS-SCNC: 26 MMOL/L (ref 22–29)
EGFRCR SERPLBLD CKD-EPI 2021: >90 ML/MIN/1.73M2
FASTING STATUS PATIENT QL REPORTED: YES
FASTING STATUS PATIENT QL REPORTED: YES
GLUCOSE SERPL-MCNC: 107 MG/DL (ref 70–99)
HBA1C MFR BLD: 5.5 % (ref 0–5.6)
HDLC SERPL-MCNC: 36 MG/DL
LDLC SERPL CALC-MCNC: 96 MG/DL
NONHDLC SERPL-MCNC: 117 MG/DL
POTASSIUM SERPL-SCNC: 4.8 MMOL/L (ref 3.4–5.3)
PROT SERPL-MCNC: 7.6 G/DL (ref 6.4–8.3)
SODIUM SERPL-SCNC: 138 MMOL/L (ref 135–145)
TRIGL SERPL-MCNC: 106 MG/DL
VIT D+METAB SERPL-MCNC: 34 NG/ML (ref 20–50)

## 2024-06-06 PROCEDURE — 97110 THERAPEUTIC EXERCISES: CPT | Mod: GP | Performed by: PHYSICAL THERAPIST

## 2024-06-06 NOTE — RESULT ENCOUNTER NOTE
"            Dear Derick  Your cholesterol looks great except for the HDL.  This is the good cholesterol and ideally we like to see this number above 50.  Regular aerobic exercise can increase this.    Your calcium level was a little high- please schedule a nonfasting lab only appointment to recheck this in 2 weeks.  This can be caused by disorders of the bone or may be a lab error.  We will check an ionized calcium level to get a better picture of this.    Your blood sugar is borderline elevated.  I have added on an A1C - this is a test that looks at the blood sugar over the last 3 months.  Your blood sugar is in the \"prediabetes\" range.  You are not currently diabetic, but at risk for developing this disease in the future.   Exercise, weight loss,  and limiting carbohydrates and sugars in the diet can be helpful to avoid progression to diabetes.  At minimum we need to check your blood sugar annually.    Please be seen urgently if you develop any signs or symptoms of diabetes (increase thirst or urination, fatigue, blurry vision, unexplained weight loss).     Your vitamin D level was normal.     Your blood counts and hemoglobin were normal.     Please call or MyChart my office with any questions or concerns.   Elisha Gonzalez, PAC          "

## 2024-06-11 ENCOUNTER — TELEPHONE (OUTPATIENT)
Dept: FAMILY MEDICINE | Facility: CLINIC | Age: 44
End: 2024-06-11
Payer: COMMERCIAL

## 2024-06-11 NOTE — TELEPHONE ENCOUNTER
"Patient has not read [employer] results please call and advise      Your A1C (test that looks at the blood sugar over the last 3 months) was normal.                     Your cholesterol looks great except for the HDL.  This is the good cholesterol and ideally we like to see this number above 50.  Regular aerobic exercise can increase this.     Your calcium level was a little high- please schedule a nonfasting lab only appointment to recheck this in 2 weeks.  This can be caused by disorders of the bone or may be a lab error.  We will check an ionized calcium level to get a better picture of this.     Your blood sugar is borderline elevated.  I have added on an A1C - this is a test that looks at the blood sugar over the last 3 months.  Your blood sugar is in the \"prediabetes\" range.  You are not currently diabetic, but at risk for developing this disease in the future.  Exercise, weight loss,  and limiting carbohydrates and sugars in the diet can be helpful to avoid progression to diabetes.  At minimum we need to check your blood sugar annually.    Please be seen urgently if you develop any signs or symptoms of diabetes (increase thirst or urination, fatigue, blurry vision, unexplained weight loss).     Your vitamin D level was normal.     Your blood counts and hemoglobin were normal.     Please call or MyChart my office with any questions or concerns.     "

## 2024-06-11 NOTE — TELEPHONE ENCOUNTER
Pt states he has read mychart result note. Pt transferred to scheduling to make non fasting lab appointment in two weeks.  Lashell BANKSN, RN

## 2024-06-12 ENCOUNTER — LAB (OUTPATIENT)
Dept: LAB | Facility: CLINIC | Age: 44
End: 2024-06-12
Payer: COMMERCIAL

## 2024-06-12 DIAGNOSIS — E83.52 HYPERCALCEMIA: ICD-10-CM

## 2024-06-12 DIAGNOSIS — R73.01 ELEVATED FASTING GLUCOSE: Primary | ICD-10-CM

## 2024-06-12 DIAGNOSIS — Z13.1 SCREENING FOR DIABETES MELLITUS: ICD-10-CM

## 2024-06-12 LAB — CA-I BLD-MCNC: 4.7 MG/DL (ref 4.4–5.2)

## 2024-06-12 PROCEDURE — 36415 COLL VENOUS BLD VENIPUNCTURE: CPT

## 2024-06-12 PROCEDURE — 82330 ASSAY OF CALCIUM: CPT

## 2024-06-13 NOTE — RESULT ENCOUNTER NOTE
Dear Derick  Your calcium level was normal.  Please call or MyChart my office with any questions or concerns.   Elisha Gonzalez, PAC

## 2024-06-27 ENCOUNTER — THERAPY VISIT (OUTPATIENT)
Dept: PHYSICAL THERAPY | Facility: CLINIC | Age: 44
End: 2024-06-27
Payer: COMMERCIAL

## 2024-06-27 DIAGNOSIS — M54.42 ACUTE BILATERAL LOW BACK PAIN WITH LEFT-SIDED SCIATICA: Chronic | ICD-10-CM

## 2024-06-27 DIAGNOSIS — M54.2 CERVICALGIA: Primary | ICD-10-CM

## 2024-06-27 PROCEDURE — 97110 THERAPEUTIC EXERCISES: CPT | Mod: GP | Performed by: PHYSICAL THERAPIST

## 2024-07-07 ENCOUNTER — HEALTH MAINTENANCE LETTER (OUTPATIENT)
Age: 44
End: 2024-07-07

## 2024-07-11 ENCOUNTER — THERAPY VISIT (OUTPATIENT)
Dept: PHYSICAL THERAPY | Facility: CLINIC | Age: 44
End: 2024-07-11
Payer: COMMERCIAL

## 2024-07-11 DIAGNOSIS — M54.2 CERVICALGIA: Primary | ICD-10-CM

## 2024-07-11 DIAGNOSIS — M54.42 ACUTE BILATERAL LOW BACK PAIN WITH LEFT-SIDED SCIATICA: Chronic | ICD-10-CM

## 2024-07-11 PROCEDURE — 97110 THERAPEUTIC EXERCISES: CPT | Mod: GP | Performed by: PHYSICAL THERAPIST

## 2024-07-17 DIAGNOSIS — M54.9 UPPER BACK PAIN: ICD-10-CM

## 2024-07-17 DIAGNOSIS — S20.219D CONTUSION OF CHEST WALL, UNSPECIFIED LATERALITY, SUBSEQUENT ENCOUNTER: ICD-10-CM

## 2024-07-17 DIAGNOSIS — M54.50 ACUTE BILATERAL LOW BACK PAIN WITHOUT SCIATICA: ICD-10-CM
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
## 2024-07-17 DIAGNOSIS — S06.0X0D CONCUSSION WITHOUT LOSS OF CONSCIOUSNESS, SUBSEQUENT ENCOUNTER: ICD-10-CM

## 2024-07-17 RX ORDER — NAPROXEN 500 MG/1
500 TABLET ORAL 2 TIMES DAILY WITH MEALS
Qty: 30 TABLET | Refills: 0 | Status: SHIPPED | OUTPATIENT
Start: 2024-07-17

## 2024-07-18 ENCOUNTER — THERAPY VISIT (OUTPATIENT)
Dept: PHYSICAL THERAPY | Facility: CLINIC | Age: 44
End: 2024-07-18
Payer: COMMERCIAL

## 2024-07-18 DIAGNOSIS — M54.2 CERVICALGIA: Primary | ICD-10-CM

## 2024-07-18 DIAGNOSIS — M54.42 ACUTE BILATERAL LOW BACK PAIN WITH LEFT-SIDED SCIATICA: Chronic | ICD-10-CM

## 2024-07-18 PROCEDURE — 97110 THERAPEUTIC EXERCISES: CPT | Mod: GP | Performed by: PHYSICAL THERAPIST
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
## 2024-11-06 ENCOUNTER — VIRTUAL VISIT (OUTPATIENT)
Dept: FAMILY MEDICINE | Facility: CLINIC | Age: 44
End: 2024-11-06
Payer: COMMERCIAL

## 2024-11-06 ENCOUNTER — TELEPHONE (OUTPATIENT)
Dept: FAMILY MEDICINE | Facility: CLINIC | Age: 44
End: 2024-11-06

## 2024-11-06 ENCOUNTER — MYC MEDICAL ADVICE (OUTPATIENT)
Dept: FAMILY MEDICINE | Facility: CLINIC | Age: 44
End: 2024-11-06

## 2024-11-06 DIAGNOSIS — M25.511 ACUTE PAIN OF RIGHT SHOULDER: Primary | ICD-10-CM

## 2024-11-06 PROCEDURE — 99213 OFFICE O/P EST LOW 20 MIN: CPT | Mod: 95 | Performed by: FAMILY MEDICINE

## 2024-11-06 RX ORDER — OXYCODONE HYDROCHLORIDE 5 MG/1
5 TABLET ORAL EVERY 6 HOURS PRN
Qty: 12 TABLET | Refills: 0 | Status: SHIPPED | OUTPATIENT
Start: 2024-11-06 | End: 2024-11-08

## 2024-11-06 NOTE — PROGRESS NOTES
Derick is a 43 year old who is being evaluated via a billable video visit.    What phone number would you like to be contacted at? [phone]  How would you like to obtain your AVS? MyChart      Assessment & Plan     Acute pain of right shoulder  Needs refill til can see ortho in 2 days  Recommended ibuprofen and tylenol first line with oxy as needed for breakthrough pain  - oxyCODONE (ROXICODONE) 5 MG tablet; Take 1 tablet (5 mg) by mouth every 6 hours as needed for pain.          Nicotine/Tobacco Cessation  He reports that he has been smoking cigarettes. He has a 4.3 pack-year smoking history. He has been exposed to tobacco smoke. He has never used smokeless tobacco.  Nicotine/Tobacco Cessation Plan  Information offered: Patient not interested at this time            Subjective   Derick is a 43 year old, presenting for the following health issues:  Shoulder Pain (Seen in er/)      11/6/2024     4:23 PM   Additional Questions   Roomed by marvin   Accompanied by self     Shoulder Pain    History of Present Illness       Reason for visit:  Seen in ER for shoulder pain, torn ligament  Symptoms include:  Pain in shoulder  Symptom intensity:  Severe  Symptom progression:  Staying the same  Had these symptoms before:  No  What makes it worse:  Movement  What makes it better:  Ice helps slightly   He is taking medications regularly.     Pt with shoulder pain starting on Sunday  Got worse through the day  Went to Er on SUnday night due to pain  Had MRI which showed complex tear of labrum  He was given 12 oxycodone and has gone through those  Needs 2 at night to help him sleep  He is out and would like refill  PDMP checked and no concerns      Review of Systems  Constitutional, HEENT, cardiovascular, pulmonary, gi and gu systems are negative, except as otherwise noted.      Objective           Vitals:  No vitals were obtained today due to virtual visit.    Physical Exam   GENERAL: alert and no distress  EYES: Eyes grossly normal  to inspection.  No discharge or erythema, or obvious scleral/conjunctival abnormalities.  RESP: No audible wheeze, cough, or visible cyanosis.    SKIN: Visible skin clear. No significant rash, abnormal pigmentation or lesions.  NEURO: Cranial nerves grossly intact.  Mentation and speech appropriate for age.  PSYCH: Appropriate affect, tone, and pace of words    No results found for this or any previous visit (from the past 24 hours).      Video-Visit Details    Type of service:  Video Visit   Originating Location (pt. Location): Home    Distant Location (provider location):  On-site  Platform used for Video Visit: Jose  Signed Electronically by: Katerina De Dios MD

## 2024-11-06 NOTE — TELEPHONE ENCOUNTER
Pt calling re: medication request.    Was seen in at [facility] ED on 11/04/24 for right shoulder pain. The following excerpt is from the clinical notes during his visit:        Pt was Rx'd #12 oxycodone 5mg tablets take 1-2 tabs q 8 hrs PRN for severe pain and has a follow up scheduled with [facility] on Friday Nov 8. He is now out of the medication and has been having severe pain, unable to sleep. Has been taking Tylenol, ibuprofen, using ice and lidocaine patches with no relief.     Advised pt that he would need to have some sort of an appointment with a provider in order to prescribe any kind of pain medication like that. PCP unavailable until tomorrow, so scheduled for virtual visit with Dr. De Dios this evening at 5pm. Pt instructed to call back with any new or worsening symptoms and verbalized understanding.    Deisi Littlejohn, RN, BSN  [facility]

## 2024-11-06 NOTE — TELEPHONE ENCOUNTER
Please see telephone encounter 11/6/24.    Deisi Littlejohn, RN, BSN  -[facility]

## 2024-11-06 NOTE — TELEPHONE ENCOUNTER
Medication Question or Refill    Contacts       Contact Date/Time Type Contact Phone/Fax    11/06/2024 01:48 PM CST Phone (Incoming) Derick Keys (Self) [phone] (H)            What medication are you calling about (include dose and sig)?: oxycodone 5mg immediate release tab    Preferred Pharmacy:   [id number] - [place], [place] - [address]  [address] [address] [address]  Phone: [phone] Fax: [phone]      Controlled Substance Agreement on file:   CSA -- Patient Level:    CSA: None found at the patient level.       Who prescribed the medication?: ER    Do you need a refill? Yes but also states doesn't need full rx just partial til surgery-pt says he has an appt in [facility] in Friday.    When did you use the medication last? 7am 11/6/24    Patient offered an appointment? No    Do you have any questions or concerns?  Yes: see above      Could we send this information to you in [facility] or would you prefer to receive a phone call?:   Patient would prefer a phone call   Okay to leave a detailed message?: Yes at Home number on file [phone] (home)

## 2024-11-08 ENCOUNTER — TELEPHONE (OUTPATIENT)
Dept: FAMILY MEDICINE | Facility: CLINIC | Age: 44
End: 2024-11-08
Payer: COMMERCIAL

## 2024-11-08 DIAGNOSIS — M25.511 ACUTE PAIN OF RIGHT SHOULDER: ICD-10-CM

## 2024-11-08 RX ORDER — OXYCODONE HYDROCHLORIDE 5 MG/1
5 TABLET ORAL EVERY 6 HOURS PRN
Qty: 5 TABLET | Refills: 0 | Status: SHIPPED | OUTPATIENT
Start: 2024-11-08

## 2024-11-08 NOTE — TELEPHONE ENCOUNTER
Ortho advised him he has a pinched nerve.  Pt would like refill for weekend.  Lashell Ashley BSN, RN

## 2024-11-08 NOTE — TELEPHONE ENCOUNTER
Patient calling for refill, med and pharmacy pending. Patient saw ortho today but they are unable to prescribe to non surgical patients. Patient not getting sleep due to pain          Po Aldridge

## 2024-11-08 NOTE — TELEPHONE ENCOUNTER
- I have never seen him for this - given 12 tablets 2 days ago   Given 5 tablets to get through the weekend -needs an appointment for further refills

## 2024-11-08 NOTE — TELEPHONE ENCOUNTER
We discussed at visit that ortho would be responsible for further narcotic refills or he can talk to his PMD.      Katerina De Dios MD

## 2024-11-08 NOTE — TELEPHONE ENCOUNTER
Clinic RN: Please investigate patient's chart or contact patient if the information cannot be found because  pharmacy should have refill sent on 11/6.  Contact pharmacy.    DARREN CarsonN, RN  [facility]

## 2024-11-08 NOTE — TELEPHONE ENCOUNTER
Writer called pt and notified him of provider's message. He stated that he only need this for the weekend. He doesn't like taking this medication but he is in so much pain. He will give us a call a schedule for further refill if this continues

## 2025-07-19 ENCOUNTER — HEALTH MAINTENANCE LETTER (OUTPATIENT)
Age: 45
End: 2025-07-19